# Patient Record
Sex: MALE | Race: WHITE | Employment: FULL TIME | URBAN - METROPOLITAN AREA
[De-identification: names, ages, dates, MRNs, and addresses within clinical notes are randomized per-mention and may not be internally consistent; named-entity substitution may affect disease eponyms.]

---

## 2021-03-02 ENCOUNTER — HOSPITAL ENCOUNTER (EMERGENCY)
Age: 31
Discharge: HOME | End: 2021-03-02
Attending: EMERGENCY MEDICINE

## 2021-03-02 VITALS
RESPIRATION RATE: 20 BRPM | HEIGHT: 70 IN | WEIGHT: 265 LBS | BODY MASS INDEX: 37.94 KG/M2 | DIASTOLIC BLOOD PRESSURE: 99 MMHG | TEMPERATURE: 97.8 F | OXYGEN SATURATION: 97 % | HEART RATE: 88 BPM | SYSTOLIC BLOOD PRESSURE: 150 MMHG

## 2021-03-02 DIAGNOSIS — I10 UNCONTROLLED HYPERTENSION: Primary | ICD-10-CM

## 2021-03-02 PROCEDURE — 99283 EMERGENCY DEPT VISIT LOW MDM: CPT

## 2021-03-02 PROCEDURE — 6370000000 HC RX 637 (ALT 250 FOR IP): Performed by: EMERGENCY MEDICINE

## 2021-03-02 PROCEDURE — 96372 THER/PROPH/DIAG INJ SC/IM: CPT

## 2021-03-02 PROCEDURE — 6360000002 HC RX W HCPCS: Performed by: EMERGENCY MEDICINE

## 2021-03-02 RX ORDER — LISINOPRIL AND HYDROCHLOROTHIAZIDE 20; 12.5 MG/1; MG/1
1 TABLET ORAL DAILY
COMMUNITY
End: 2021-03-02 | Stop reason: SDUPTHER

## 2021-03-02 RX ORDER — CLONIDINE HYDROCHLORIDE 0.1 MG/1
0.2 TABLET ORAL ONCE
Status: COMPLETED | OUTPATIENT
Start: 2021-03-02 | End: 2021-03-02

## 2021-03-02 RX ORDER — LISINOPRIL AND HYDROCHLOROTHIAZIDE 20; 12.5 MG/1; MG/1
1 TABLET ORAL DAILY
Qty: 30 TABLET | Refills: 1 | Status: SHIPPED | OUTPATIENT
Start: 2021-03-02 | End: 2021-03-17 | Stop reason: SDUPTHER

## 2021-03-02 RX ORDER — KETOROLAC TROMETHAMINE 30 MG/ML
60 INJECTION, SOLUTION INTRAMUSCULAR; INTRAVENOUS ONCE
Status: COMPLETED | OUTPATIENT
Start: 2021-03-02 | End: 2021-03-02

## 2021-03-02 RX ADMIN — CLONIDINE HYDROCHLORIDE 0.2 MG: 0.1 TABLET ORAL at 02:35

## 2021-03-02 RX ADMIN — KETOROLAC TROMETHAMINE 60 MG: 30 INJECTION, SOLUTION INTRAMUSCULAR at 02:34

## 2021-03-02 SDOH — HEALTH STABILITY: MENTAL HEALTH: HOW OFTEN DO YOU HAVE A DRINK CONTAINING ALCOHOL?: NEVER

## 2021-03-02 ASSESSMENT — PAIN SCALES - GENERAL: PAINLEVEL_OUTOF10: 6

## 2021-03-02 NOTE — ED PROVIDER NOTES
CHIEF COMPLAINT  Hypertension (Patient comes into ED with c/o headache, hypertension for about a week. Patient states he ran out of his blood pressure medication. )      HISTORY OF PRESENT ILLNESS  Flaquito Trevizo  is a 33 y.o. male who presents to the ED at via private vehicle complaining of uncontrolled hypertension. Patient reports being out of his blood pressure medication for almost 1 year. Does not currently have a PCP. Recently purchased a wrist blood pressure cuff and states pressures have been elevated as high as 190/110. Review of systems positive for neck pain due to falling backwards off of a ladder while at work. States he has been followed by [employer]. and also sees a chiropractor for adjustments. No report of dizziness, ataxia, blurred vision, chest pain, dyspnea, abdominal pain. There are no other complaints, modifying factors or associated symptoms. Nursing notes reviewed. Past medical history:  has a past medical history of Hypertension. Past surgical history:  has a past surgical history that includes knee surgery and shoulder surgery. Home medications:   Prior to Admission medications    Medication Sig Start Date End Date Taking? Authorizing Provider   lisinopril-hydroCHLOROthiazide (PRINZIDE;ZESTORETIC) 20-12.5 MG per tablet Take 1 tablet by mouth daily 3/2/21  Yes Carlita Amezquita MD       No Known Allergies    Social history:  reports that he has never smoked. His smokeless tobacco use includes snuff. He reports that he does not drink alcohol or use drugs. Family history:    Family History   Problem Relation Age of Onset    High Blood Pressure Mother        REVIEW OF SYSTEMS  6 systems reviewed, pertinent positives per HPI otherwise noted to be negative    PHYSICAL EXAM  Vitals:    03/02/21 0304   BP: (!) 150/100   Pulse: 88   Resp: 19   Temp:    SpO2: 97%       GENERAL: Patient is well-developed, well-nourished,  no acute distress.  No apparent discomfort. Non toxic appearing. HEENT:  Normocephalic, atraumatic. PERRL. Conjunctiva appear normal.  External ears are normal.  MMM  NECK: Supple with normal ROM. Trachea midline  LUNGS:  Normal work of breathing. Speaking comfortably in full sentences. EXTREMITIES: 2+ distal pulses w/o edema. MUSCULOSKELETAL:  Atraumatic extremities with normal ROM grossly. No obvious bony deformities. SKIN: Warm/dry. No rashes/lesions noted. PSYCHIATRIC: Patient is alert and oriented with normal affect  NEUROLOGIC: Cranial nerves grossly intact. Moves all extremities with equal strength. No gross sensory deficits. Answers questions/follows commands appropriately. ED COURSE/MDM  Nursing notes reviewed. Blood pressure improving with clonidine. Patient resting comfortably on reevaluation, appears well. Discussed the importance of close outpatient follow-up for further blood pressure management as well as general health maintenance. Earlier report of neck pain and headache in triage was actually related to a workplace injury for which he is following up with [employer]'s [employer]. and a chiropractor. Clinical Impression  Based on the presenting complaint, history, and physical exam, multiple diagnoses were considered. Exam and workup here most c/w:  1. Uncontrolled hypertension        I discussed with Morales Nichole the results of evaluation in the ED, diagnosis, care, and prognosis. The plan is to discharge to home. Patient is in agreement with plan and questions have been answered. I also discussed with Morales Nichole the reasons which may require a return visit and the importance of follow-up care. The patient is well-appearing, nontoxic, and improved at the time of discharge. Patient agrees to call to arrange follow-up care as directed. Morales Nichole understands to return immediately for worsening/change in symptoms.       Patient will be started on the following medications from the ED:  New Prescriptions    No medications on file         Disposition  Pt is discharged in stable condition.     Disposition Vitals:  BP (!) 150/100   Pulse 88   Temp 97.8 °F (36.6 °C)   Resp 19   Ht 5' 10\" (1.778 m)   Wt 265 lb (120.2 kg)   SpO2 97%   BMI 38.02 kg/m²                  Lindy Tidwell MD  03/02/21 8697

## 2021-03-17 ENCOUNTER — OFFICE VISIT (OUTPATIENT)
Dept: FAMILY MEDICINE CLINIC | Age: 31
End: 2021-03-17

## 2021-03-17 VITALS
SYSTOLIC BLOOD PRESSURE: 150 MMHG | BODY MASS INDEX: 42.66 KG/M2 | OXYGEN SATURATION: 96 % | DIASTOLIC BLOOD PRESSURE: 100 MMHG | HEIGHT: 70 IN | WEIGHT: 298 LBS | TEMPERATURE: 98 F | HEART RATE: 103 BPM

## 2021-03-17 DIAGNOSIS — E66.01 CLASS 3 SEVERE OBESITY DUE TO EXCESS CALORIES WITH SERIOUS COMORBIDITY AND BODY MASS INDEX (BMI) OF 40.0 TO 44.9 IN ADULT (HCC): ICD-10-CM

## 2021-03-17 DIAGNOSIS — I10 HYPERTENSION, UNSPECIFIED TYPE: ICD-10-CM

## 2021-03-17 DIAGNOSIS — Z00.00 ANNUAL PHYSICAL EXAM: Primary | ICD-10-CM

## 2021-03-17 LAB
A/G RATIO: 1.4 (ref 1.1–2.2)
ALBUMIN SERPL-MCNC: 5 G/DL (ref 3.4–5)
ALP BLD-CCNC: 79 U/L (ref 40–129)
ALT SERPL-CCNC: 75 U/L (ref 10–40)
ANION GAP SERPL CALCULATED.3IONS-SCNC: 16 MMOL/L (ref 3–16)
AST SERPL-CCNC: 30 U/L (ref 15–37)
BILIRUB SERPL-MCNC: 0.5 MG/DL (ref 0–1)
BUN BLDV-MCNC: 15 MG/DL (ref 7–20)
CALCIUM SERPL-MCNC: 10 MG/DL (ref 8.3–10.6)
CHLORIDE BLD-SCNC: 99 MMOL/L (ref 99–110)
CHOLESTEROL, TOTAL: 192 MG/DL (ref 0–199)
CO2: 24 MMOL/L (ref 21–32)
CREAT SERPL-MCNC: 1 MG/DL (ref 0.9–1.3)
GFR AFRICAN AMERICAN: >60
GFR NON-AFRICAN AMERICAN: >60
GLOBULIN: 3.5 G/DL
GLUCOSE BLD-MCNC: 106 MG/DL (ref 70–99)
HCT VFR BLD CALC: 50.5 % (ref 40.5–52.5)
HDLC SERPL-MCNC: 28 MG/DL (ref 40–60)
HEMOGLOBIN: 17.3 G/DL (ref 13.5–17.5)
LDL CHOLESTEROL CALCULATED: ABNORMAL MG/DL
LDL CHOLESTEROL DIRECT: 96 MG/DL
MCH RBC QN AUTO: 28.7 PG (ref 26–34)
MCHC RBC AUTO-ENTMCNC: 34.3 G/DL (ref 31–36)
MCV RBC AUTO: 83.6 FL (ref 80–100)
PDW BLD-RTO: 13.6 % (ref 12.4–15.4)
PLATELET # BLD: 289 K/UL (ref 135–450)
PMV BLD AUTO: 9 FL (ref 5–10.5)
POTASSIUM SERPL-SCNC: 4.9 MMOL/L (ref 3.5–5.1)
RBC # BLD: 6.04 M/UL (ref 4.2–5.9)
SODIUM BLD-SCNC: 139 MMOL/L (ref 136–145)
TOTAL PROTEIN: 8.5 G/DL (ref 6.4–8.2)
TRIGL SERPL-MCNC: 493 MG/DL (ref 0–150)
TSH SERPL DL<=0.05 MIU/L-ACNC: 1.91 UIU/ML (ref 0.27–4.2)
VLDLC SERPL CALC-MCNC: ABNORMAL MG/DL
WBC # BLD: 8.9 K/UL (ref 4–11)

## 2021-03-17 PROCEDURE — 99203 OFFICE O/P NEW LOW 30 MIN: CPT | Performed by: NURSE PRACTITIONER

## 2021-03-17 PROCEDURE — 36415 COLL VENOUS BLD VENIPUNCTURE: CPT | Performed by: NURSE PRACTITIONER

## 2021-03-17 RX ORDER — LISINOPRIL AND HYDROCHLOROTHIAZIDE 20; 12.5 MG/1; MG/1
2 TABLET ORAL DAILY
Qty: 60 TABLET | Refills: 1 | Status: SHIPPED | OUTPATIENT
Start: 2021-03-17 | End: 2021-05-26 | Stop reason: SDUPTHER

## 2021-03-17 SDOH — ECONOMIC STABILITY: FOOD INSECURITY: WITHIN THE PAST 12 MONTHS, THE FOOD YOU BOUGHT JUST DIDN'T LAST AND YOU DIDN'T HAVE MONEY TO GET MORE.: NEVER TRUE

## 2021-03-17 SDOH — ECONOMIC STABILITY: TRANSPORTATION INSECURITY
IN THE PAST 12 MONTHS, HAS THE LACK OF TRANSPORTATION KEPT YOU FROM MEDICAL APPOINTMENTS OR FROM GETTING MEDICATIONS?: NO

## 2021-03-17 SDOH — ECONOMIC STABILITY: TRANSPORTATION INSECURITY
IN THE PAST 12 MONTHS, HAS LACK OF TRANSPORTATION KEPT YOU FROM MEETINGS, WORK, OR FROM GETTING THINGS NEEDED FOR DAILY LIVING?: NO

## 2021-03-17 ASSESSMENT — ENCOUNTER SYMPTOMS
VOMITING: 0
DIARRHEA: 0
ABDOMINAL PAIN: 0
SHORTNESS OF BREATH: 0
NAUSEA: 0
COUGH: 0

## 2021-03-17 ASSESSMENT — PATIENT HEALTH QUESTIONNAIRE - PHQ9
SUM OF ALL RESPONSES TO PHQ QUESTIONS 1-9: 0
SUM OF ALL RESPONSES TO PHQ QUESTIONS 1-9: 0

## 2021-03-17 NOTE — PATIENT INSTRUCTIONS
- Get labs drawn today. I will call with results in a few days. - Increase your Prinzide to 2 tablets once per day. - work on diet modifications and increasing exercise to help with blood pressure and weight loss. - DASH diet to help with blood pressure. - Continue taking blood pressure at home with arm cuff. Bring readings to next visit. Follow up in 2 weeks. Patient Education        Low Sodium Diet (2,000 Milligram): Care Instructions  Overview     Limiting sodium can be an important part of managing some health problems. The most common source of sodium is salt. People get most of the salt in their diet from canned, prepared, and packaged foods. Fast food and restaurant meals also are very high in sodium. Your doctor will probably limit your sodium to less than 2,000 milligrams (mg) a day. This limit counts all the sodium in prepared and packaged foods and any salt you add to your food. Follow-up care is a key part of your treatment and safety. Be sure to make and go to all appointments, and call your doctor if you are having problems. It's also a good idea to know your test results and keep a list of the medicines you take. How can you care for yourself at home? Read food labels  · Read labels on cans and food packages. The labels tell you how much sodium is in each serving. Make sure that you look at the serving size. If you eat more than the serving size, you have eaten more sodium. · Food labels also tell you the Percent Daily Value for sodium. Choose products with low Percent Daily Values for sodium. · Be aware that sodium can come in forms other than salt, including monosodium glutamate (MSG), sodium citrate, and sodium bicarbonate (baking soda). MSG is often added to Asian food. When you eat out, you can sometimes ask for food without MSG or added salt.   Buy low-sodium foods  · Buy foods that are labeled \"unsalted\" (no salt added), \"sodium-free\" (less than 5 mg of sodium per serving), or \"low-sodium\" (140 mg or less of sodium per serving). Foods labeled \"reduced-sodium\" and \"light sodium\" may still have too much sodium. Be sure to read the label to see how much sodium you are getting. · Buy fresh vegetables, or frozen vegetables without added sauces. Buy low-sodium versions of canned vegetables, soups, and other canned goods. Prepare low-sodium meals  · Cut back on the amount of salt you use in cooking. This will help you adjust to the taste. Do not add salt after cooking. One teaspoon of salt has about 2,300 mg of sodium. · Take the salt shaker off the table. · Flavor your food with garlic, lemon juice, onion, vinegar, herbs, and spices. Do not use soy sauce, lite soy sauce, steak sauce, onion salt, garlic salt, celery salt, or ketchup on your food. · Use low-sodium salad dressings, sauces, and ketchup. Or make your own salad dressings and sauces without adding salt. · Use less salt (or none) when recipes call for it. You can often use half the salt a recipe calls for without losing flavor. Other foods such as rice, pasta, and grains do not need added salt. · Rinse canned vegetables, and cook them in fresh water. This removes somebut not allof the salt. · Avoid water that is naturally high in sodium or that has been treated with water softeners, which add sodium. If you buy bottled water, read the label and choose a sodium-free brand. Avoid high-sodium foods  · Avoid eating:  ? Smoked, cured, salted, and canned meat, fish, and poultry. ? Ham, malcolm, hot dogs, and luncheon meats. ? Regular, hard, and processed cheese and regular peanut butter. ? Crackers with salted tops, and other salted snack foods such as pretzels, chips, and salted popcorn. ? Frozen prepared meals, unless labeled low-sodium. ? Canned and dried soups, broths, and bouillon, unless labeled sodium-free or low-sodium. ? Canned vegetables, unless labeled sodium-free or low-sodium. ?  [place] fries, pizza, tacos, and other fast foods. ? Pickles, olives, ketchup, and other condiments, especially soy sauce, unless labeled sodium-free or low-sodium. Where can you learn more? Go to https://barbara.[employer]. org and sign in to your [employer] account. Enter V241 in the [facility] box to learn more about \"Low Sodium Diet (2,000 Milligram): Care Instructions. \"     If you do not have an account, please click on the \"Sign Up Now\" link. Current as of: December 17, 2020               Content Version: 12.8  © 2006-2021 [employer]. Care instructions adapted under license by [facility] ([facility]). If you have questions about a medical condition or this instruction, always ask your healthcare professional. [address] any warranty or liability for your use of this information. Patient Education        Low Sodium Diet (2,000 Milligram): Care Instructions  Overview     Limiting sodium can be an important part of managing some health problems. The most common source of sodium is salt. People get most of the salt in their diet from canned, prepared, and packaged foods. Fast food and restaurant meals also are very high in sodium. Your doctor will probably limit your sodium to less than 2,000 milligrams (mg) a day. This limit counts all the sodium in prepared and packaged foods and any salt you add to your food. Follow-up care is a key part of your treatment and safety. Be sure to make and go to all appointments, and call your doctor if you are having problems. It's also a good idea to know your test results and keep a list of the medicines you take. How can you care for yourself at home? Read food labels  · Read labels on cans and food packages. The labels tell you how much sodium is in each serving. Make sure that you look at the serving size. If you eat more than the serving size, you have eaten more sodium. · Food labels also tell you the Percent Daily Value for sodium.  Choose products with low Percent Daily Values for sodium. · Be aware that sodium can come in forms other than salt, including monosodium glutamate (MSG), sodium citrate, and sodium bicarbonate (baking soda). MSG is often added to Asian food. When you eat out, you can sometimes ask for food without MSG or added salt. Buy low-sodium foods  · Buy foods that are labeled \"unsalted\" (no salt added), \"sodium-free\" (less than 5 mg of sodium per serving), or \"low-sodium\" (140 mg or less of sodium per serving). Foods labeled \"reduced-sodium\" and \"light sodium\" may still have too much sodium. Be sure to read the label to see how much sodium you are getting. · Buy fresh vegetables, or frozen vegetables without added sauces. Buy low-sodium versions of canned vegetables, soups, and other canned goods. Prepare low-sodium meals  · Cut back on the amount of salt you use in cooking. This will help you adjust to the taste. Do not add salt after cooking. One teaspoon of salt has about 2,300 mg of sodium. · Take the salt shaker off the table. · Flavor your food with garlic, lemon juice, onion, vinegar, herbs, and spices. Do not use soy sauce, lite soy sauce, steak sauce, onion salt, garlic salt, celery salt, or ketchup on your food. · Use low-sodium salad dressings, sauces, and ketchup. Or make your own salad dressings and sauces without adding salt. · Use less salt (or none) when recipes call for it. You can often use half the salt a recipe calls for without losing flavor. Other foods such as rice, pasta, and grains do not need added salt. · Rinse canned vegetables, and cook them in fresh water. This removes somebut not allof the salt. · Avoid water that is naturally high in sodium or that has been treated with water softeners, which add sodium. If you buy bottled water, read the label and choose a sodium-free brand. Avoid high-sodium foods  · Avoid eating:  ? Smoked, cured, salted, and canned meat, fish, and poultry.   ? Ham, malcolm, hot dogs, and luncheon meats. ? Regular, hard, and processed cheese and regular peanut butter. ? Crackers with salted tops, and other salted snack foods such as pretzels, chips, and salted popcorn. ? Frozen prepared meals, unless labeled low-sodium. ? Canned and dried soups, broths, and bouillon, unless labeled sodium-free or low-sodium. ? Canned vegetables, unless labeled sodium-free or low-sodium. ? [place] fries, pizza, tacos, and other fast foods. ? Pickles, olives, ketchup, and other condiments, especially soy sauce, unless labeled sodium-free or low-sodium. Where can you learn more? Go to https://[employer]Mintedeb[employer] org and sign in to your [employer] account. Enter K438 in the [facility] box to learn more about \"Low Sodium Diet (2,000 Milligram): Care Instructions. \"     If you do not have an account, please click on the \"Sign Up Now\" link. Current as of: December 17, 2020               Content Version: 12.8  © 0621-3085 [employer] Inc[employer]. Care instructions adapted under license by [facility] ([facility]). If you have questions about a medical condition or this instruction, always ask your healthcare professional. [address] any warranty or liability for your use of this information.

## 2021-03-17 NOTE — PROGRESS NOTES
3/17/2021     Vibha Fleming (:  1990) is a 33 y.o. male, here for evaluation of the following medical concerns:    Chief Complaint   Patient presents with   Murali Mckeon     HPI   Here for new patient to establish care. Has never been to a primary care physician previously. Has a baby on the way and would like to start getting healthy. He states he has numerous orthopedic injuries and nine concussions from sports, [employer] and bull riding. Starting a new job next Monday as a . Recently moved here from [place]. Was in ED for hypertension - 183/136. Had headache that was not relieved with Tylenol. He was discharged with Prinzide 20-12.5 and is currently taking it once daily. He has been checking his blood pressure in morning with SBPs in the 140s and has noticed it increasing in the evening 170/90. He states he has not had a SBP in the 120s/130s since high school. He has not been officially diagnosed with hypertension, but had high blood pressure when he was in [place] during a hospital visit. He was given a prescription for Prinzide in [place], but he was not consistent with compliance only taking it when he felt bad. The prescription ran out and he never got it refilled due to no PCP. Weighed 180lbs after graduating from high school. He states he started gaining weight during a neck injury from bull riding. He was in a neck brace for 6 months for precautions. He started . His diet consisted of a honey bun and a mountain dew for breakfast, skipped lunch, and then would overeat at dinner time. Review of Systems   Constitutional: Negative for activity change, appetite change, fatigue, fever and unexpected weight change. Respiratory: Negative for cough and shortness of breath. Cardiovascular: Positive for leg swelling (at end of the day). Negative for chest pain.    Gastrointestinal: Negative for abdominal pain, diarrhea, nausea and vomiting. Neurological: Positive for headaches. Negative for dizziness. Prior to Visit Medications    Medication Sig Taking? Authorizing Provider   lisinopril-hydroCHLOROthiazide (PRINZIDE;ZESTORETIC) 20-12.5 MG per tablet Take 2 tablets by mouth daily Yes KELLI Bland - CNP        Social History     Tobacco Use    Smoking status: Never Smoker    Smokeless tobacco: Current User     Types: Snuff   Substance Use Topics    Alcohol use: Never     Frequency: Never        Vitals:    03/17/21 0918 03/17/21 0919   BP: (!) 152/99 (!) 150/100   Site: Left Lower Arm Right Lower Arm   Position: Sitting Sitting   Cuff Size: Medium Adult Medium Adult   Pulse: 103    Temp: 98 °F (36.7 °C)    SpO2: 96%    Weight: 298 lb (135.2 kg)    Height: 5' 10\" (1.778 m)      Estimated body mass index is 42.76 kg/m² as calculated from the following:    Height as of this encounter: 5' 10\" (1.778 m). Weight as of this encounter: 298 lb (135.2 kg). Physical Exam  Constitutional:       General: He is awake. Appearance: He is well-developed. He is morbidly obese. HENT:      Head: Normocephalic. Eyes:      Extraocular Movements: Extraocular movements intact. Conjunctiva/sclera: Conjunctivae normal.      Pupils: Pupils are equal, round, and reactive to light. Cardiovascular:      Rate and Rhythm: Normal rate and regular rhythm. Heart sounds: No murmur. No friction rub. No gallop. Pulmonary:      Effort: Pulmonary effort is normal. No respiratory distress. Breath sounds: Normal breath sounds. No wheezing. Abdominal:      General: Abdomen is protuberant. Musculoskeletal: Normal range of motion. Right lower leg: No edema. Left lower leg: No edema. Skin:     General: Skin is warm and dry. Neurological:      Mental Status: He is alert. Psychiatric:         Mood and Affect: Mood normal.         Behavior: Behavior normal. Behavior is cooperative.      ASSESSMENT/PLAN: 1. Annual physical exam  2. Hypertension, unspecified type  - Increase Prinzide to two tablets, once daily.  - Educated on diet modifications to include decreasing salt intake and limiting fat intake. - Continue taking blood pressure at home and bring list of readings to next visit. - CBC  - COMPREHENSIVE METABOLIC PANEL  - HEMOGLOBIN A1C  - Lipid Panel  - TSH without Reflex  3. Class 3 severe obesity due to excess calories with serious comorbidity and body mass index (BMI) of 40.0 to 44.9 in [facility])  - Educated on diet modifications and increasing physical activity. Return in about 2 weeks (around 3/31/2021). An electronic signature was used to authenticate this note.     --KELLI Morales - CNP on 3/17/2021 at 10:25 AM

## 2021-03-18 LAB
ESTIMATED AVERAGE GLUCOSE: 102.5 MG/DL
HBA1C MFR BLD: 5.2 %

## 2021-04-01 ENCOUNTER — OFFICE VISIT (OUTPATIENT)
Dept: FAMILY MEDICINE CLINIC | Age: 31
End: 2021-04-01

## 2021-04-01 VITALS
HEART RATE: 92 BPM | DIASTOLIC BLOOD PRESSURE: 85 MMHG | WEIGHT: 294 LBS | HEIGHT: 70 IN | SYSTOLIC BLOOD PRESSURE: 141 MMHG | OXYGEN SATURATION: 96 % | TEMPERATURE: 98.9 F | BODY MASS INDEX: 42.09 KG/M2

## 2021-04-01 DIAGNOSIS — E66.01 CLASS 3 SEVERE OBESITY DUE TO EXCESS CALORIES WITH SERIOUS COMORBIDITY AND BODY MASS INDEX (BMI) OF 40.0 TO 44.9 IN ADULT (HCC): ICD-10-CM

## 2021-04-01 DIAGNOSIS — I10 ESSENTIAL HYPERTENSION: Primary | ICD-10-CM

## 2021-04-01 PROBLEM — E66.813 CLASS 3 SEVERE OBESITY DUE TO EXCESS CALORIES WITH SERIOUS COMORBIDITY AND BODY MASS INDEX (BMI) OF 40.0 TO 44.9 IN ADULT: Status: ACTIVE | Noted: 2021-04-01

## 2021-04-01 PROCEDURE — 99211 OFF/OP EST MAY X REQ PHY/QHP: CPT | Performed by: NURSE PRACTITIONER

## 2021-04-01 RX ORDER — AMLODIPINE BESYLATE 5 MG/1
5 TABLET ORAL DAILY
Qty: 30 TABLET | Refills: 5 | Status: SHIPPED | OUTPATIENT
Start: 2021-04-01 | End: 2021-10-19

## 2021-04-01 ASSESSMENT — ENCOUNTER SYMPTOMS
SHORTNESS OF BREATH: 0
VOMITING: 0
ABDOMINAL PAIN: 0
DIARRHEA: 0
NAUSEA: 0
COUGH: 0

## 2021-04-01 NOTE — PROGRESS NOTES
shortness of breath. Snoring   Cardiovascular: Positive for leg swelling (at end of the day). Negative for chest pain. Gastrointestinal: Negative for abdominal pain, diarrhea, nausea and vomiting. Neurological: Positive for headaches. Negative for dizziness. Prior to Visit Medications    Medication Sig Taking? Authorizing Provider   amLODIPine (NORVASC) 5 MG tablet Take 1 tablet by mouth daily Yes KELLI Lopez CNP   lisinopril-hydroCHLOROthiazide (PRINZIDE;ZESTORETIC) 20-12.5 MG per tablet Take 2 tablets by mouth daily Yes KELLI Lopez CNP        Social History     Tobacco Use    Smoking status: Never Smoker    Smokeless tobacco: Current User     Types: Snuff   Substance Use Topics    Alcohol use: Never     Frequency: Never        Vitals:    04/01/21 1427 04/01/21 1428   BP: (!) 144/92 (!) 141/85   Site: Right Lower Arm Left Lower Arm   Position: Sitting Sitting   Cuff Size: Medium Adult Medium Adult   Pulse: 92    Temp: 98.9 °F (37.2 °C)    SpO2: 96%    Weight: 294 lb (133.4 kg)    Height: 5' 10\" (1.778 m)      Estimated body mass index is 42.18 kg/m² as calculated from the following:    Height as of this encounter: 5' 10\" (1.778 m). Weight as of this encounter: 294 lb (133.4 kg). Physical Exam  Constitutional:       General: He is awake. Appearance: He is well-developed. He is morbidly obese. HENT:      Head: Normocephalic. Eyes:      Extraocular Movements: Extraocular movements intact. Conjunctiva/sclera: Conjunctivae normal.      Pupils: Pupils are equal, round, and reactive to light. Cardiovascular:      Rate and Rhythm: Normal rate and regular rhythm. Heart sounds: No murmur. No friction rub. No gallop. Pulmonary:      Effort: Pulmonary effort is normal. No respiratory distress. Breath sounds: Normal breath sounds. No wheezing. Abdominal:      General: Abdomen is protuberant. Musculoskeletal: Normal range of motion. Right lower leg: No edema. Left lower leg: No edema. Skin:     General: Skin is warm and dry. Neurological:      Mental Status: He is alert. Psychiatric:         Mood and Affect: Mood normal.         Behavior: Behavior normal. Behavior is cooperative. ASSESSMENT/PLAN:  1. Essential hypertension- remains uncontrolled but has improved. Will continue Lisinopril-HCTZ and add on amlodipine 5 mg. Advised on possible side effects including swelling.  - amLODIPine (NORVASC) 5 MG tablet; Take 1 tablet by mouth daily  Dispense: 30 tablet; Refill: 5    2. Class 3 severe obesity due to excess calories with serious comorbidity and body mass index (BMI) of 40.0 to 44.9 in adult [facility])- Educated on diet modifications and increasing physical activity. Return in about 1 month (around 5/1/2021). An electronic signature was used to authenticate this note.     --KELLI Mcgowan - CNP on 4/1/2021 at 8:48 PM

## 2021-04-01 NOTE — PATIENT INSTRUCTIONS
Start amlodipine daily for high blood pressure  Continue Lisinopril-HCTZ  Low salt diet  Exercise  Follow up next month

## 2021-05-26 RX ORDER — LISINOPRIL AND HYDROCHLOROTHIAZIDE 20; 12.5 MG/1; MG/1
2 TABLET ORAL DAILY
Qty: 60 TABLET | Refills: 5 | Status: SHIPPED | OUTPATIENT
Start: 2021-05-26 | End: 2022-01-24

## 2021-06-29 ENCOUNTER — TELEMEDICINE (OUTPATIENT)
Dept: FAMILY MEDICINE CLINIC | Age: 31
End: 2021-06-29
Payer: COMMERCIAL

## 2021-06-29 DIAGNOSIS — J40 BRONCHITIS: Primary | ICD-10-CM

## 2021-06-29 PROCEDURE — 99213 OFFICE O/P EST LOW 20 MIN: CPT | Performed by: NURSE PRACTITIONER

## 2021-06-29 RX ORDER — ALBUTEROL SULFATE 90 UG/1
2 AEROSOL, METERED RESPIRATORY (INHALATION) 4 TIMES DAILY PRN
Qty: 1 INHALER | Refills: 0 | Status: SHIPPED | OUTPATIENT
Start: 2021-06-29 | End: 2021-12-10

## 2021-06-29 RX ORDER — PREDNISONE 10 MG/1
TABLET ORAL
Qty: 18 TABLET | Refills: 0 | Status: SHIPPED | OUTPATIENT
Start: 2021-06-29 | End: 2021-07-06 | Stop reason: ALTCHOICE

## 2021-06-29 ASSESSMENT — ENCOUNTER SYMPTOMS
COUGH: 1
DIARRHEA: 0
NAUSEA: 0
CHEST TIGHTNESS: 1
VOMITING: 0
SHORTNESS OF BREATH: 1

## 2021-06-29 NOTE — PATIENT INSTRUCTIONS
Start prednisone. (oral steroid taper)- you should avoid taking NSAIDs while on this medication (ex: ibuprofen, aleve, aspirin). Tylenol is OK to take. Albuterol inhaler 2 puffs every 4 hours as needed  Improvement in symptoms in 2 days call office or if symptoms worsen go to the emergency room.

## 2021-06-29 NOTE — LETTER
[address]  Phone: [phone]  Fax: [phone]    Corrinne Check, APRN - CNP        June 29, 2021     Patient: Ryan Bautista   YOB: 1990   Date of Visit: 6/29/2021       To Whom it May Concern:    Ryan Bautista was seen in my clinic on 6/29/2021. Please excuse his absence from work for the rest of the day today, 6/29/21, 6/30/21 and 7/1/21. If you have any questions or concerns, please don't hesitate to call.     Sincerely,           Corrinne Check, APRN - CNP

## 2021-06-29 NOTE — PROGRESS NOTES
Patient-Reported Weight 290   Patient-Reported Height 510   Patient-Reported Systolic 712   Patient-Reported Diastolic 90        Physical Exam    [INSTRUCTIONS:  \"[x]\" Indicates a positive item  \"[]\" Indicates a negative item  -- DELETE ALL ITEMS NOT EXAMINED]    Constitutional: [x] Appears well-developed and well-nourished [x] No apparent distress      [] Abnormal -     Mental status: [x] Alert and awake  [x] Oriented to person/place/time [x] Able to follow commands    [] Abnormal -     Eyes:   EOM    [x]  Normal    [] Abnormal -   Sclera  [x]  Normal    [] Abnormal -          Discharge [x]  None visible   [] Abnormal -     HENT: [x] Normocephalic, atraumatic  [] Abnormal -   [] Mouth/Throat: Mucous membranes are moist    External Ears [] Normal  [] Abnormal -    Neck: [x] No visualized mass [] Abnormal -     Pulmonary/Chest: [x] Respiratory effort normal   [x] No visualized signs of difficulty breathing or respiratory distress        [] Abnormal -      Musculoskeletal:   [x] Normal gait with no signs of ataxia         [x] Normal range of motion of neck        [] Abnormal -     Neurological:        [x] No Facial Asymmetry (Cranial nerve 7 motor function) (limited exam due to video visit)          [x] No gaze palsy        [] Abnormal -          Skin:        [x] No significant exanthematous lesions or discoloration noted on facial skin         [] Abnormal -            Psychiatric:       [x] Normal Affect [] Abnormal -        [x] No Hallucinations    Other pertinent observable physical exam findings:-          On this date 6/29/2021 I have spent 20 minutes reviewing previous notes, test results and face to face (virtual) with the patient discussing the diagnosis and importance of compliance with the treatment plan as well as documenting on the day of the visit. Abdi Massey, was evaluated through a synchronous (real-time) audio-video encounter.  The patient (or guardian if applicable) is aware that this is a billable service. Verbal consent to proceed has been obtained within the past 12 months. The visit was conducted pursuant to the emergency declaration under the [address] [place] [address] authority and the [employer] General Act. Patient identification was verified, and a caregiver was present when appropriate. The patient was located in a state where the provider was credentialed to provide care. An electronic signature was used to authenticate this note.     --Trinity Aponte, APRN - CNP

## 2021-07-01 ENCOUNTER — APPOINTMENT (OUTPATIENT)
Dept: CT IMAGING | Age: 31
End: 2021-07-01
Payer: COMMERCIAL

## 2021-07-01 ENCOUNTER — APPOINTMENT (OUTPATIENT)
Dept: GENERAL RADIOLOGY | Age: 31
End: 2021-07-01
Payer: COMMERCIAL

## 2021-07-01 ENCOUNTER — HOSPITAL ENCOUNTER (EMERGENCY)
Age: 31
Discharge: HOME | End: 2021-07-02
Payer: COMMERCIAL

## 2021-07-01 DIAGNOSIS — J18.9 PNEUMONIA DUE TO INFECTIOUS ORGANISM, UNSPECIFIED LATERALITY, UNSPECIFIED PART OF LUNG: Primary | ICD-10-CM

## 2021-07-01 DIAGNOSIS — R06.02 SHORTNESS OF BREATH: ICD-10-CM

## 2021-07-01 DIAGNOSIS — R07.89 CHEST TIGHTNESS: ICD-10-CM

## 2021-07-01 LAB
A/G RATIO: 1.2 (ref 1.1–2.2)
ALBUMIN SERPL-MCNC: 4.5 G/DL (ref 3.4–5)
ALP BLD-CCNC: 82 U/L (ref 40–129)
ALT SERPL-CCNC: 50 U/L (ref 10–40)
ANION GAP SERPL CALCULATED.3IONS-SCNC: 15 MMOL/L (ref 3–16)
AST SERPL-CCNC: <5 U/L (ref 15–37)
BASOPHILS ABSOLUTE: 0 K/UL (ref 0–0.2)
BASOPHILS RELATIVE PERCENT: 0 %
BILIRUB SERPL-MCNC: <0.2 MG/DL (ref 0–1)
BUN BLDV-MCNC: 20 MG/DL (ref 7–20)
CALCIUM SERPL-MCNC: 9.8 MG/DL (ref 8.3–10.6)
CHLORIDE BLD-SCNC: 99 MMOL/L (ref 99–110)
CO2: 22 MMOL/L (ref 21–32)
CREAT SERPL-MCNC: 0.9 MG/DL (ref 0.9–1.3)
EOSINOPHILS ABSOLUTE: 0.4 K/UL (ref 0–0.6)
EOSINOPHILS RELATIVE PERCENT: 2 %
GFR AFRICAN AMERICAN: >60
GFR NON-AFRICAN AMERICAN: >60
GLOBULIN: 3.8 G/DL
GLUCOSE BLD-MCNC: 98 MG/DL (ref 70–99)
HCT VFR BLD CALC: 46.1 % (ref 40.5–52.5)
HEMOGLOBIN: 16.5 G/DL (ref 13.5–17.5)
LYMPHOCYTES ABSOLUTE: 2.9 K/UL (ref 1–5.1)
LYMPHOCYTES RELATIVE PERCENT: 16 %
MCH RBC QN AUTO: 30 PG (ref 26–34)
MCHC RBC AUTO-ENTMCNC: 35.7 G/DL (ref 31–36)
MCV RBC AUTO: 84 FL (ref 80–100)
METAMYELOCYTES RELATIVE PERCENT: 2 %
MONOCYTES ABSOLUTE: 1.1 K/UL (ref 0–1.3)
MONOCYTES RELATIVE PERCENT: 6 %
NEUTROPHILS ABSOLUTE: 13.8 K/UL (ref 1.7–7.7)
NEUTROPHILS RELATIVE PERCENT: 74 %
PDW BLD-RTO: 13.1 % (ref 12.4–15.4)
PLATELET # BLD: 315 K/UL (ref 135–450)
PLATELET SLIDE REVIEW: ADEQUATE
PMV BLD AUTO: 8.2 FL (ref 5–10.5)
POTASSIUM SERPL-SCNC: 4 MMOL/L (ref 3.5–5.1)
RBC # BLD: 5.49 M/UL (ref 4.2–5.9)
SLIDE REVIEW: ABNORMAL
SODIUM BLD-SCNC: 136 MMOL/L (ref 136–145)
TOTAL PROTEIN: 8.3 G/DL (ref 6.4–8.2)
TROPONIN: <0.01 NG/ML
WBC # BLD: 18.1 K/UL (ref 4–11)

## 2021-07-01 PROCEDURE — 71046 X-RAY EXAM CHEST 2 VIEWS: CPT

## 2021-07-01 PROCEDURE — 80053 COMPREHEN METABOLIC PANEL: CPT

## 2021-07-01 PROCEDURE — 93005 ELECTROCARDIOGRAM TRACING: CPT

## 2021-07-01 PROCEDURE — 99284 EMERGENCY DEPT VISIT MOD MDM: CPT

## 2021-07-01 PROCEDURE — 71260 CT THORAX DX C+: CPT

## 2021-07-01 PROCEDURE — 85025 COMPLETE CBC W/AUTO DIFF WBC: CPT

## 2021-07-01 PROCEDURE — 84484 ASSAY OF TROPONIN QUANT: CPT

## 2021-07-01 PROCEDURE — 6360000004 HC RX CONTRAST MEDICATION: Performed by: NURSE PRACTITIONER

## 2021-07-01 RX ORDER — IPRATROPIUM BROMIDE AND ALBUTEROL SULFATE 2.5; .5 MG/3ML; MG/3ML
1 SOLUTION RESPIRATORY (INHALATION) ONCE
Status: DISCONTINUED | OUTPATIENT
Start: 2021-07-01 | End: 2021-07-02

## 2021-07-01 RX ORDER — AMOXICILLIN 875 MG/1
875 TABLET, COATED ORAL 2 TIMES DAILY
COMMUNITY
End: 2021-07-06 | Stop reason: ALTCHOICE

## 2021-07-01 RX ADMIN — IOPAMIDOL 75 ML: 755 INJECTION, SOLUTION INTRAVENOUS at 22:21

## 2021-07-01 ASSESSMENT — PAIN SCALES - GENERAL: PAINLEVEL_OUTOF10: 8

## 2021-07-01 ASSESSMENT — PAIN DESCRIPTION - DESCRIPTORS: DESCRIPTORS: JABBING

## 2021-07-01 ASSESSMENT — PAIN DESCRIPTION - LOCATION: LOCATION: CHEST

## 2021-07-01 ASSESSMENT — PAIN DESCRIPTION - PAIN TYPE: TYPE: ACUTE PAIN

## 2021-07-01 ASSESSMENT — PAIN DESCRIPTION - ORIENTATION: ORIENTATION: MID

## 2021-07-01 NOTE — LETTER
Wagnerbessie Grover was seen and treated in our emergency department on 7/1/2021. He may return to work on 07/07/2021    If you have any questions or concerns, please don't hesitate to call.

## 2021-07-02 VITALS
SYSTOLIC BLOOD PRESSURE: 146 MMHG | RESPIRATION RATE: 22 BRPM | OXYGEN SATURATION: 98 % | WEIGHT: 295 LBS | BODY MASS INDEX: 42.23 KG/M2 | HEIGHT: 70 IN | TEMPERATURE: 99 F | HEART RATE: 82 BPM | DIASTOLIC BLOOD PRESSURE: 88 MMHG

## 2021-07-02 LAB
EKG ATRIAL RATE: 99 BPM
EKG DIAGNOSIS: NORMAL
EKG P AXIS: 53 DEGREES
EKG P-R INTERVAL: 146 MS
EKG Q-T INTERVAL: 334 MS
EKG QRS DURATION: 112 MS
EKG QTC CALCULATION (BAZETT): 428 MS
EKG R AXIS: 2 DEGREES
EKG T AXIS: 32 DEGREES
EKG VENTRICULAR RATE: 99 BPM

## 2021-07-02 RX ORDER — LEVOFLOXACIN 750 MG/1
750 TABLET ORAL DAILY
Qty: 4 TABLET | Refills: 0 | Status: SHIPPED | OUTPATIENT
Start: 2021-07-02 | End: 2021-07-06

## 2021-07-02 RX ORDER — LEVOFLOXACIN 750 MG/1
750 TABLET ORAL DAILY
Qty: 4 TABLET | Refills: 0 | Status: SHIPPED | OUTPATIENT
Start: 2021-07-02 | End: 2021-07-02 | Stop reason: SDUPTHER

## 2021-07-02 RX ORDER — IPRATROPIUM BROMIDE AND ALBUTEROL SULFATE 2.5; .5 MG/3ML; MG/3ML
1 SOLUTION RESPIRATORY (INHALATION) EVERY 4 HOURS
Qty: 360 ML | Refills: 0 | Status: SHIPPED | OUTPATIENT
Start: 2021-07-02 | End: 2021-12-10

## 2021-07-02 RX ORDER — LEVOFLOXACIN 750 MG/1
750 TABLET ORAL ONCE
Status: DISCONTINUED | OUTPATIENT
Start: 2021-07-02 | End: 2021-07-02 | Stop reason: HOSPADM

## 2021-07-02 NOTE — ED NOTES
Patient verbalized understanding in taking medications and follow up. Work note given for patient to be off until Wednesday.       Abraham Williamson RN  07/02/21 0040

## 2021-07-02 NOTE — ED PROVIDER NOTES
EKG Interpretation    Interpreted by emergency department physician    Rhythm: normal sinus   Rate: normal  Axis: normal  Ectopy: none  Conduction: normal  ST Segments: normal  T Waves: normal  Q Waves: none    Clinical Impression: no acute changes    MD Paresh Mcghee MD  07/01/21 1223

## 2021-07-02 NOTE — ED PROVIDER NOTES
tablets by mouth daily 60 tablet 5    amLODIPine (NORVASC) 5 MG tablet Take 1 tablet by mouth daily 30 tablet 5       ALLERGIES    No Known Allergies    FAMILY HISTORY    Family History   Problem Relation Age of Onset    High Blood Pressure Mother     Heart Surgery Mother         CABG    Diabetes Mother     Heart Attack Mother     Diabetes Sister     Diabetes Brother     Heart Surgery Maternal Grandfather     Diabetes Maternal Grandfather     Heart Attack Maternal Grandfather     Hypertension Brother     Diabetes Brother        SOCIAL HISTORY    Social History     Socioeconomic History    Marital status: Single     Spouse name: None    Number of children: None    Years of education: None    Highest education level: None   Occupational History    None   Tobacco Use    Smoking status: Never Smoker    Smokeless tobacco: Current User     Types: Chew   Vaping Use    Vaping Use: Never used   Substance and Sexual Activity    Alcohol use: Never    Drug use: Never    Sexual activity: Yes     Partners: Female   Other Topics Concern    None   Social History Narrative    None     Social Determinants of Health     Financial Resource Strain: Low Risk     Difficulty of Paying Living Expenses: Not hard at all   Food Insecurity: No Food Insecurity    Worried About Running Out of Food in the Last Year: Never true    [place] of Food in the Last Year: Never true   Transportation Needs: No Transportation Needs    Lack of Transportation (Medical): No    Lack of Transportation (Non-Medical):  No   Physical Activity:     Days of Exercise per Week:     Minutes of Exercise per Session:    Stress:     Feeling of Stress :    Social Connections:     Frequency of Communication with Friends and Family:     Frequency of Social Gatherings with Friends and Family:     Attends [employer] Services:     Active Member of Clubs or Organizations:     Attends Club or Organization Meetings:     Marital Status: Intimate Partner Violence:     Fear of Current or Ex-Partner:     Emotionally Abused:     Physically Abused:     Sexually Abused:        REVIEW OF SYSTEMS    10 systems reviewed, pertinent positives per HPI otherwise noted to be negative    PHYSICAL EXAM  Vitals:    07/02/21 0030   BP: (!) 146/88   Pulse: 82   Resp: 22   Temp: 99 °F (37.2 °C)   SpO2: 98%     GENERAL: Patient is well-developed, morbidly obese. Awake and alert. Cooperative. Resting in bed. No apparent distress. HEENT:  Normocephalic, atraumatic. Conjunctiva appear normal. Sclera is non-icteric. External ears are normal.    NECK: Supple with normal ROM. Trachea midline. LUNGS: Equal and symmetric chest rise. Breathing is unlabored. Speaking comfortably in full sentences. Lungs are clear bilaterally to auscultation. Without wheezing, rales, or rhonchi. CADIOVASCULAR:  Regular rate and rhythm. Normal S1-S2 sounds. No murmurs, rubs, or gallops. Capillary refill is brisk in all 4 extremities. Bilateral lower extremities are equal in size, there is no swelling observed. There is no tenderness to palpation. There is no erythema observed or warmth palpated. GI: Soft, nontender, nondistended with positive bowel sounds. No rebound tenderness, guarding or any peritoneal signs. No masses or hepatosplenomegaly    MUSCULOSKELETAL:  No gross deformities or trauma noted. Moving all extremities equally and appropriately. Normal ROM. SKIN: Warm/dry. Skin is intact. No rashes/lesions noted. PSYCHIATRIC: Mood and affect appropriate. Speech is clear and articulate. NEUROLOGIC: Alert and oriented. No focal motor or sensory deficits. LABS  I have reviewed all labs for this visit.    Results for orders placed or performed during the hospital encounter of 07/01/21   Comprehensive Metabolic Panel   Result Value Ref Range    Sodium 136 136 - 145 mmol/L    Potassium 4.0 3.5 - 5.1 mmol/L    Chloride 99 99 - 110 mmol/L    CO2 22 21 - 32 mmol/L    Anion Gap 15 3 - 16    Glucose 98 70 - 99 mg/dL    BUN 20 7 - 20 mg/dL    CREATININE 0.9 0.9 - 1.3 mg/dL    GFR Non-African American >60 >60    GFR African American >60 >60    Calcium 9.8 8.3 - 10.6 mg/dL    Total Protein 8.3 (H) 6.4 - 8.2 g/dL    Albumin 4.5 3.4 - 5.0 g/dL    Albumin/Globulin Ratio 1.2 1.1 - 2.2    Total Bilirubin <0.2 0.0 - 1.0 mg/dL    Alkaline Phosphatase 82 40 - 129 U/L    ALT 50 (H) 10 - 40 U/L    AST <5 (A) 15 - 37 U/L    Globulin 3.8 g/dL   Troponin   Result Value Ref Range    Troponin <0.01 <0.01 ng/mL   CBC Auto Differential   Result Value Ref Range    WBC 18.1 (H) 4.0 - 11.0 K/uL    RBC 5.49 4.20 - 5.90 M/uL    Hemoglobin 16.5 13.5 - 17.5 g/dL    Hematocrit 46.1 40.5 - 52.5 %    MCV 84.0 80.0 - 100.0 fL    MCH 30.0 26.0 - 34.0 pg    MCHC 35.7 31.0 - 36.0 g/dL    RDW 13.1 12.4 - 15.4 %    Platelets 742 391 - 540 K/uL    MPV 8.2 5.0 - 10.5 fL    PLATELET SLIDE REVIEW Adequate     SLIDE REVIEW see below     Neutrophils % 74.0 %    Lymphocytes % 16.0 %    Monocytes % 6.0 %    Eosinophils % 2.0 %    Basophils % 0.0 %    Neutrophils Absolute 13.8 (H) 1.7 - 7.7 K/uL    Lymphocytes Absolute 2.9 1.0 - 5.1 K/uL    Monocytes Absolute 1.1 0.0 - 1.3 K/uL    Eosinophils Absolute 0.4 0.0 - 0.6 K/uL    Basophils Absolute 0.0 0.0 - 0.2 K/uL    Metamyelocytes Relative 2 (A) %       RADIOLOGY    XR CHEST (2 VW)    Result Date: 7/1/2021  EXAMINATION: TWO XRAY VIEWS OF THE CHEST 7/1/2021 7:47 pm COMPARISON: None. HISTORY: ORDERING SYSTEM PROVIDED HISTORY: CP TECHNOLOGIST PROVIDED HISTORY: Reason for exam:->CP Reason for Exam: cp Acuity: Acute Type of Exam: Initial FINDINGS: Frontal and lateral views of the chest.  Normal lung volume. No focal airspace disease. Normal pulmonary vasculature. No pleural effusion or pneumothorax. Normal cardiomediastinal silhouette and great vessels. No acute osseous abnormality. No acute cardiopulmonary process.      CT CHEST PULMONARY EMBOLISM W CONTRAST    Result Date: 7/1/2021  EXAMINATION: CTA OF THE CHEST 7/1/2021 10:11 pm TECHNIQUE: CTA of the chest was performed after the administration of intravenous contrast.  Multiplanar reformatted images are provided for review. MIP images are provided for review. Dose modulation, iterative reconstruction, and/or weight based adjustment of the mA/kV was utilized to reduce the radiation dose to as low as reasonably achievable. COMPARISON: None. HISTORY: ORDERING SYSTEM PROVIDED HISTORY: CP, SOB TECHNOLOGIST PROVIDED HISTORY: Reason for exam:->CP, SOB Decision Support Exception - unselect if not a suspected or confirmed emergency medical condition->Emergency Medical Condition (MA) Reason for Exam: Chest pains and SOB, symptoms started a few weeks ago, no prior history of clot Acuity: Acute Type of Exam: Initial FINDINGS: Pulmonary Arteries: Pulmonary arteries are within normal limits in size. . Artifact degraded evaluation of the pulmonary arteries. No filling defect is identified in the pulmonary arteries to the level of theproximal segmental arteries. Mediastinum: Heart is borderline enlarged. No pericardial effusion. Borderline dilated aortic root with suboptimal evaluation due to motion artifact. Aorta is otherwise within normal limits in size. No luminal defect is appreciated in the visualized thoracic aorta given motion artifact. Lungs/pleura: Central airways are patent. Mosaic attenuation of the lungs. No focal consolidation. No pleural effusion. No pneumothorax. Soft Tissues/Bones: No adenopathy. Minimal degenerative changes noted in the visualized spine without spondylolisthesis. Upper Abdomen: Diffuse hepatic steatosis with fatty sparing along the gallbladder fossa. 1.  Artifact degraded evaluation of the pulmonary arteries. No acute pulmonary embolism to the proximal segmental arteries. 2. Borderline dilated aortic root with suboptimal evaluation due to motion artifact.   No thoracic aortic dissection given motion artifact. Follow-up recommended. 3. Nonspecific mosaic attenuation of the lungs. Correlate with presentation for distal airway inflammation. Correlate clinically to exclude early pneumonia. 4. Other findings as described. HEART SCORE:    History: +0 for low suspicion  EKG: +0 for normal EKG   Age: [de-identified] for age <45 years  Risk factors (includes HLD, HTN, DM, tobacco use, obesity, and +FHx): +2 for known CAD or 3+ risk factors  Initial troponin: +0 for negative troponin    Heart score: 2. This falls under the following category: Score of 0-3, which indicates a very low risk for major adverse cardiac event and supports early discharge    Score 0-3 0.9%-1.7% risk of major adverse cardiac event (MACE). In the HEART Score study, these patients were discharged. ED COURSE/MDM  Patient seen and evaluated. Old records reviewed. Diagnostic testing reviewed and results discussed. I have evaluated this patient. My supervising physician was available for consultation. Mercedes Gomez presented to the ED today with above noted complaints. Patient is afebrile and hemodynamically stable. BP is noted to be hypertensive. He is well saturated on room air. Physical exam does not reveal adventitious breath sounds on exam.     Blood work does show leukocytosis is WBC elevated at 18.1. Absolute neutrophils elevated at 13.8, no further differential shift. No anemia. There is no electrolyte abnormality. No evidence of acute kidney injury or transaminitis. Specimen lipemia has occurred which can be skewing these results. Troponin is negative. EKG is without acute findings. Chest x-ray is unremarkable. I did obtain a CT scan of the chest There was motion degradation but no acute PE was seen to the proximal segmental arteries. Borderline dilated aortic root. No thoracic aortic dissection. Nonspecific mosaic attenuation of the lungs.   There is could be seen with distal airway information and early pneumonia. Pt was given the following medications or treatments in the ED:   Medications   levoFLOXacin (LEVAQUIN) tablet 750 mg (750 mg Oral Not Given 7/2/21 0039)   iopamidol (ISOVUE-370) 76 % injection 75 mL (75 mLs Intravenous Given 7/1/21 2221)     This patient has had these symptoms for a couple of weeks and he is not improving I am going to go ahead and treat him for pneumonia. He was put on amoxicillin approximately 2 to 3 weeks ago, I will go ahead and start the patient on Levaquin for 5 days for pneumonia. I did advise the patient of the CT findings and of the specimen lipemia and advised him to follow-up with his primary care provider regarding this as he may need to have some further testing. At this point I do not feel the patient requires further work up and it is reasonable to discharge the patient. Please refer to AVS for further details regarding discharge instructions. A discussion was had with the patient regarding diagnosis, diagnostic testing results, treatment/ plan of care, and follow up. All questions were answered. Patient will follow up as directed for further evaluation/treatment. The patient was given strict return precautions as we discussed symptoms that would necessitate return to the ED. Patient will return to ED for new/worsening symptoms. The patient verbalized their understanding and agreement with the above plan. I estimate there is LOW risk for ACUTE CORONARY SYNDROME, INTRACRANIAL HEMORRHAGE, MALIGNANT DYSRHYTHMIA or HYPERTENSION, PULMONARY EMBOLISM, SEPSIS, SUBARACHNOID HEMORRHAGE, SUBDURAL HEMATOMA, STROKE, or THORACIC AORTIC DISSECTION, thus I consider the discharge disposition reasonable. Mercedes Gomez and I have discussed the diagnosis and risks, and we agree with discharging home to follow-up with their primary doctor. We also discussed returning to the Emergency Department immediately if new or worsening symptoms occur.  We have discussed the symptoms which are most concerning (e.g., bloody sputum, fever, worsening pain or shortness of breath, vomiting, weakness) that necessitate immediate return. Patient was sent home with a prescription for below medication/s. I did [place] patient on appropriate use of these medication. Discharge Medication List as of 7/2/2021 12:28 AM      START taking these medications    Details   ipratropium-albuterol (DUONEB) 0.5-2.5 (3) MG/3ML SOLN nebulizer solution Inhale 3 mLs into the lungs every 4 hours, Disp-360 mL, R-0Normal             CLINICAL IMPRESSION    1. Pneumonia due to infectious organism, unspecified laterality, unspecified part of lung    2. Chest tightness    3. Shortness of breath           Discharge Vitals:  Blood pressure (!) 146/88, pulse 82, temperature 99 °F (37.2 °C), temperature source Oral, resp. rate 22, height 5' 10\" (1.778 m), weight 295 lb (133.8 kg), SpO2 98 %. FOLLOW UP  KELLI Christopher - CNP  [address] [place] [address]    Call in 1 day  For further evaluation    [facility]  ED  [address] [facility] [address]  Go to   If symptoms worsen      DISPOSITION  Patient was discharged to home in good condition. Comment: Please note this report has been produced using speech recognition software and may contain errors related to that system including errors in grammar, punctuation, and spelling, as well as words and phrases that may be inappropriate. If there are any questions or concerns please feel free to contact the dictating provider for clarification.         KELLI Hernandez - CNP  07/02/21 3656

## 2021-07-06 ENCOUNTER — OFFICE VISIT (OUTPATIENT)
Dept: FAMILY MEDICINE CLINIC | Age: 31
End: 2021-07-06
Payer: COMMERCIAL

## 2021-07-06 VITALS
OXYGEN SATURATION: 96 % | HEART RATE: 104 BPM | WEIGHT: 296 LBS | DIASTOLIC BLOOD PRESSURE: 84 MMHG | SYSTOLIC BLOOD PRESSURE: 130 MMHG | RESPIRATION RATE: 16 BRPM | TEMPERATURE: 98 F | BODY MASS INDEX: 42.47 KG/M2

## 2021-07-06 DIAGNOSIS — I77.810 AORTIC ROOT DILATATION (HCC): ICD-10-CM

## 2021-07-06 DIAGNOSIS — J18.9 PNEUMONIA OF BOTH LUNGS DUE TO INFECTIOUS ORGANISM, UNSPECIFIED PART OF LUNG: ICD-10-CM

## 2021-07-06 DIAGNOSIS — E66.01 CLASS 3 SEVERE OBESITY DUE TO EXCESS CALORIES WITH SERIOUS COMORBIDITY AND BODY MASS INDEX (BMI) OF 40.0 TO 44.9 IN ADULT (HCC): ICD-10-CM

## 2021-07-06 DIAGNOSIS — I51.7 ENLARGED HEART: ICD-10-CM

## 2021-07-06 DIAGNOSIS — K76.0 HEPATIC STEATOSIS: ICD-10-CM

## 2021-07-06 DIAGNOSIS — I10 ESSENTIAL HYPERTENSION: Primary | ICD-10-CM

## 2021-07-06 DIAGNOSIS — G47.33 OSA (OBSTRUCTIVE SLEEP APNEA): ICD-10-CM

## 2021-07-06 PROCEDURE — 99214 OFFICE O/P EST MOD 30 MIN: CPT | Performed by: NURSE PRACTITIONER

## 2021-07-06 ASSESSMENT — ENCOUNTER SYMPTOMS
NAUSEA: 0
CHEST TIGHTNESS: 1
COUGH: 1
DIARRHEA: 0
VOMITING: 0
SHORTNESS OF BREATH: 1

## 2021-07-06 ASSESSMENT — PATIENT HEALTH QUESTIONNAIRE - PHQ9
2. FEELING DOWN, DEPRESSED OR HOPELESS: 0
SUM OF ALL RESPONSES TO PHQ9 QUESTIONS 1 & 2: 0
SUM OF ALL RESPONSES TO PHQ QUESTIONS 1-9: 0
1. LITTLE INTEREST OR PLEASURE IN DOING THINGS: 0
SUM OF ALL RESPONSES TO PHQ QUESTIONS 1-9: 0
SUM OF ALL RESPONSES TO PHQ QUESTIONS 1-9: 0

## 2021-07-06 NOTE — PROGRESS NOTES
History     Tobacco Use    Smoking status: Never Smoker    Smokeless tobacco: Former User     Types: Chew   Substance Use Topics    Alcohol use: Never        Vitals:    07/06/21 1050   BP: 130/84   Site: Left Upper Arm   Position: Sitting   Pulse: 104   Resp: 16   Temp: 98 °F (36.7 °C)   TempSrc: Temporal   SpO2: 96%   Weight: 296 lb (134.3 kg)     Estimated body mass index is 42.47 kg/m² as calculated from the following:    Height as of 7/1/21: 5' 10\" (1.778 m). Weight as of this encounter: 296 lb (134.3 kg). Physical Exam  Vitals and nursing note reviewed. Constitutional:       General: He is not in acute distress. Appearance: Normal appearance. He is well-developed. He is obese. He is not ill-appearing, toxic-appearing or diaphoretic. HENT:      Head: Normocephalic and atraumatic. Right Ear: External ear normal.      Left Ear: External ear normal.   Eyes:      Conjunctiva/sclera: Conjunctivae normal.      Pupils: Pupils are equal, round, and reactive to light. Cardiovascular:      Rate and Rhythm: Normal rate and regular rhythm. Heart sounds: Normal heart sounds. No murmur heard. No friction rub. No gallop. Pulmonary:      Effort: Pulmonary effort is normal. No respiratory distress. Breath sounds: Normal breath sounds. No stridor. No wheezing, rhonchi or rales. Neurological:      General: No focal deficit present. Mental Status: He is alert and oriented to person, place, and time. Mental status is at baseline. Cranial Nerves: No cranial nerve deficit. ASSESSMENT/PLAN:  1. Essential hypertension- controlled continue Hyzaar and Novasc    2. Class 3 severe obesity due to excess calories with serious comorbidity and body mass index (BMI) of 40.0 to 44.9 in adult [facility])- referral to bariatric surgery  - [address], [place]    3.  DAYNA (obstructive sleep apnea)- referral to [facility]

## 2021-07-06 NOTE — PATIENT INSTRUCTIONS
· Repeat chest x-ray in 4 weeks  · Blood work looks OK  · BP controlled  · Referral to weight loss clinic and sleep clinic  · Schedule echo

## 2021-07-07 ENCOUNTER — TELEPHONE (OUTPATIENT)
Dept: PULMONOLOGY | Age: 31
End: 2021-07-07

## 2021-07-07 NOTE — TELEPHONE ENCOUNTER
Within this Telehealth Consent, the terms you and yours refer to the person using the Telehealth Service (Service), or in the case of a use of the Service by or on behalf of a minor, you and yours refer to and include (i) the parent or legal guardian who provides consent to the use of the Service by such minor or uses the Service on behalf of such minor, and (ii) the minor for whom consent is being provided or on whose behalf the Service is being utilized. When using Service, you will be consulting with your health care providers via the use of Telehealth.   Telehealth involves the delivery of healthcare services using electronic communications, information technology or other means between a healthcare provider and a patient who are not in the same physical location. Telehealth may be used for diagnosis, treatment, follow-up and/or patient education, and may include, but is not limited to, one or more of the following:    Electronic transmission of medical records, photo images, personal health information or other data between a patient and a healthcare provider    Interactions between a patient and healthcare provider via audio, video and/or data communications    Use of output data from medical devices, sound and video files    Anticipated Benefits   The use of Telehealth by your Provider(s) through the Service may have the following possible benefits:    Making it easier and more efficient for you to access medical care and treatment for the conditions treated by such Provider(s) utilizing the Service    Allowing you to obtain medical care and treatment by Provider(s) at times that are convenient for you    Enabling you to interact with Provider(s) without the necessity of an in-office appointment     Possible Risks   While the use of Telehealth can provide potential benefits for you, there are also potential risks associated with the use of Telehealth.  These risks include, but may not be the provision of medical care and treatment via Telehealth and the Service and you may not be able to receive diagnosis and/or treatment through the Service for every condition for which you seek diagnosis and/or treatment. 11. There are potential risks to the use of Telehealth, including but not limited to the risks described in this Telehealth Consent. 12. Your Provider(s) have discussed the use of Telehealth and the Service with you, including the benefits and risks of such and you have provided oral consent to your Provider(s) for the use of Telehealth and the Service. 15. You understand that it is your duty to provide your Provider(s) truthful, accurate and complete information, including all relevant information regarding care that you may have received or may be receiving from other healthcare providers outside of the Service. 14. You understand that each of your Provider(s) may determine in his or sole discretion that your condition is not suitable for diagnosis and/or treatment using the Service, and that you may need to seek medical care and treatment a specialist or other healthcare provider, outside of the Service. 15. You understand that you are fully responsible for payment for all services provided by Provider(s) or through use of the Service and that you may not be able to use third-party insurance. 16. You represent that (a) you have read this Telehealth Consent carefully, (b) you understand the risks and benefits of the Service and the use of Telehealth in the medical care and treatment provided to you by Provider(s) using the Service, and (c) you have the legal capacity and authority to provide this consent for yourself and/or the minor for which you are consenting under applicable federal and state laws, including laws relating to the age of [de-identified] and/or parental/guardian consent.    17. You give your informed consent to the use of Telehealth by Provider(s) using the Service under the terms described in the Terms of Service and this Telehealth Consent. The patient was read the following statement and has consented to the visit as of 7/7/21. The patient has been scheduled for their first telehealth visit on 9/14/21 with .

## 2021-07-15 RX ORDER — METHYLPREDNISOLONE 4 MG/1
TABLET ORAL
Qty: 1 KIT | Refills: 0 | Status: SHIPPED | OUTPATIENT
Start: 2021-07-15 | End: 2021-12-03

## 2021-09-14 ENCOUNTER — VIRTUAL VISIT (OUTPATIENT)
Dept: PULMONOLOGY | Age: 31
End: 2021-09-14

## 2021-09-14 ENCOUNTER — TELEPHONE (OUTPATIENT)
Dept: PULMONOLOGY | Age: 31
End: 2021-09-14

## 2021-09-14 DIAGNOSIS — Z72.820 SLEEP DEPRIVATION: ICD-10-CM

## 2021-09-14 DIAGNOSIS — R53.83 FATIGUE, UNSPECIFIED TYPE: ICD-10-CM

## 2021-09-14 DIAGNOSIS — G47.30 OBSERVED SLEEP APNEA: ICD-10-CM

## 2021-09-14 DIAGNOSIS — R06.83 SNORING: ICD-10-CM

## 2021-09-14 DIAGNOSIS — G47.30 OBSERVED SLEEP APNEA: Primary | ICD-10-CM

## 2021-09-14 DIAGNOSIS — G47.10 HYPERSOMNIA: Primary | ICD-10-CM

## 2021-09-14 DIAGNOSIS — G47.33 MODERATE OBSTRUCTIVE SLEEP APNEA: ICD-10-CM

## 2021-09-14 DIAGNOSIS — G47.10 HYPERSOMNIA: ICD-10-CM

## 2021-09-14 PROCEDURE — 99204 OFFICE O/P NEW MOD 45 MIN: CPT | Performed by: INTERNAL MEDICINE

## 2021-09-14 ASSESSMENT — SLEEP AND FATIGUE QUESTIONNAIRES
HOW LIKELY ARE YOU TO NOD OFF OR FALL ASLEEP IN A CAR, WHILE STOPPED FOR A FEW MINUTES IN TRAFFIC: 1
HOW LIKELY ARE YOU TO NOD OFF OR FALL ASLEEP WHILE SITTING QUIETLY AFTER LUNCH WITHOUT ALCOHOL: 2
HOW LIKELY ARE YOU TO NOD OFF OR FALL ASLEEP WHEN YOU ARE A PASSENGER IN A CAR FOR AN HOUR WITHOUT A BREAK: 0
HOW LIKELY ARE YOU TO NOD OFF OR FALL ASLEEP WHILE LYING DOWN TO REST IN THE AFTERNOON WHEN CIRCUMSTANCES PERMIT: 3
HOW LIKELY ARE YOU TO NOD OFF OR FALL ASLEEP WHILE WATCHING TV: 3
HOW LIKELY ARE YOU TO NOD OFF OR FALL ASLEEP WHILE SITTING INACTIVE IN A PUBLIC PLACE: 2
ESS TOTAL SCORE: 11
HOW LIKELY ARE YOU TO NOD OFF OR FALL ASLEEP WHILE SITTING AND READING: 0
HOW LIKELY ARE YOU TO NOD OFF OR FALL ASLEEP WHILE SITTING AND TALKING TO SOMEONE: 0

## 2021-09-14 NOTE — PROGRESS NOTES
[facility]                                                          TELEHEALTH EVALUATION: Service performed was Audio/Visual (During YORLS-34 public health emergency) and not a face-to-face visit           CHIEF COMPLAINT: Sleep referral    Consulting provider: KELLI Stuart CNP      HPI:   Patient was diagnosed with moderate DAYNA approximately 1393-5103 years ago in [place]. Associated with snoring, witnessed apnea and hypersomnia. Better with CPAP and worse without CPAP. Did not like the full face mask. Patient was not able to afford supplies and quit few months after using it. His main issue now is daytime fatigue, tiredness and sleepiness. Bedtime 7-10 pm and rise time is 1:30-4 am. Sleeps total of 5-6 hrs during work days and 7-10 hrs on the weekend. Feels better when he sleeps longer. Sleep onset few minutes. Old records were reviewed and summarized by me. Past Medical History:   Diagnosis Date    Hypertension        Past Surgical History:        Procedure Laterality Date    KNEE SURGERY      SHOULDER SURGERY         Allergies:  has No Known Allergies. Social History:    TOBACCO:   reports that he has never smoked. He quit smokeless tobacco use about 3 months ago. His smokeless tobacco use included chew. ETOH:   reports no history of alcohol use.       Family History:       Problem Relation Age of Onset    High Blood Pressure Mother     Heart Surgery Mother         CABG    Diabetes Mother     Heart Attack Mother     Diabetes Sister     Diabetes Brother     Heart Surgery Maternal Grandfather     Diabetes Maternal Grandfather     Heart Attack Maternal Grandfather     Hypertension Brother     Diabetes Brother        Current Medications:    Current Outpatient Medications:     methylPREDNISolone (MEDROL DOSEPACK) 4 MG tablet, Take by mouth., Disp: 1 kit, Rfl: 0    ipratropium-albuterol (DUONEB) 0.5-2.5 (3) MG/3ML SOLN nebulizer solution, Inhale 3 mLs into the lungs every 4 hours, Disp: 360 mL, Rfl: 0    albuterol sulfate HFA (VENTOLIN HFA) 108 (90 Base) MCG/ACT inhaler, Inhale 2 puffs into the lungs 4 times daily as needed for Wheezing, Disp: 1 Inhaler, Rfl: 0    lisinopril-hydroCHLOROthiazide (PRINZIDE;ZESTORETIC) 20-12.5 MG per tablet, Take 2 tablets by mouth daily, Disp: 60 tablet, Rfl: 5    amLODIPine (NORVASC) 5 MG tablet, Take 1 tablet by mouth daily, Disp: 30 tablet, Rfl: 5      REVIEW OF SYSTEMS:  Constitutional: Negative for fever  HENT: Negative for sore throat  Eyes: Negative for redness   Respiratory: Negative for dyspnea, cough  Cardiovascular: Negative for chest pain  Gastrointestinal: Negative for vomiting, diarrhea   Genitourinary: Negative for hematuria   Musculoskeletal: Negative for arthralgias   Skin: Negative for rash  Neurological: Negative for syncope  Hematological: Negative for adenopathy  Psychiatric/Behavorial: Negative for anxiety      Objective:   PHYSICAL EXAM:    There were no vitals taken for this visit.' on RA  O2 Sat:  Mallampati class IV. Constitutional:  No acute distress. Appears well developed and nourished. Eyes: No sclera icterus. EOM intact. No visible discharge. HENT: Head is normocephalic and atraumatic. Mucus membranes are moist and the tongue appears normal. Normal appearing nose. External Ears normal.   Neck: No visualized mass. Shellia Pain is midline   Resp: No accessory muscle use. Respiratory effort normal. No visualized signs of difficulty breathing or respiratory distress. Cardiovascular: No LE edema. Musculoskeletal: Normal gait with no signs of ataxia. Normal range of motion of the neck. Skin: No significant exanthematous lesions or discoloration noted on facial skin    Neuro: Awake. Alert. Able to follow commands. No facial asymmetry. No gaze palsy. Psych: No agitation. Normal affect. No hallucinations. Oriented to person/time/place. No anxiety. Normal judgement and insight. DATA reviewed by me:   TSH 1.91  Cr 1  Hb Hb 16.5    Assessment:       · Hypersomnia and Fatigue  · Snoring  · Observed sleep apnea   · Sleep deprivation   · Moderate DAYNA used CPAP in 2014  · Obesity class  · Essential hypertension        Plan:       [facility] Obtain old records for prior PSG/PAP titration.  CPAP titration if able to fine old PSG, otherwise split night   Nasal mask/Pillow    Discussed with patient the pathophysiology of apnea.  Sleep hygiene- advised to target 7-9 hrs of sleep   Avoid sedatives, alcohol and caffeinated drinks at bed time.  No driving motorized vehicles or operating heavy machinery while fatigue, drowsy or sleepy.  Weight loss is also recommended as a long-term intervention.  Continue blood pressure medications - treatment of DAYNA can lower blood pressure by levels that are clinically significant.  Complications of DAYNA if not treated were discussed with patient patient to include systemic hypertension, pulmonary hypertension, cardiovascular morbidities, car accidents and all cause mortality. Reji Barkley is a 33 y.o. male being evaluated by a Virtual Visit (video visit) encounter to address concerns as mentioned above. A caregiver was present when appropriate. Due to this being a TeleHealth encounter (During \[facility] [place] [facility]\""-78 public health emergency), evaluation of the following organ systems was limited: Vitals/Constitutional/EENT/Resp/CV/GI//MS/Neuro/Skin/Heme-Lymph-Imm. Pursuant to the emergency declaration under the [facility], [address] [place] [address] waiver authority and the [employer] and [employer] Act, this Virtual Visit was conducted with patient's (and/or legal guardian's) consent, to reduce the patient's risk of exposure to COVID-19 and provide necessary medical care.   The patient (and/or legal guardian) has also been advised to contact this office for worsening conditions or problems, and seek emergency medical treatment and/or call 911 if deemed necessary. Services were provided through a video synchronous discussion virtually to substitute for in-person clinic visit. Patient was located in her home, provider was located in his office. --Bessie Porter MD on 9/14/2021 at 8:45 AM    An electronic signature was used to authenticate this note.

## 2021-09-14 NOTE — LETTER
[facility] Pulmonary, Critical Care, and Sleep  [address] [facility] [address] [place] [address] [address]  Phone: [phone]  Fax: [phone]           Tracy Joyner MD      September 14, 2021     Patient: Gris Devine   MR Number: <[id number]>   YOB: 1990   Date of Visit: 9/14/2021       Dear Dr. Yue Deshpande: Thank you for referring Gris Devine to me for evaluation/treatment. Below are the relevant portions of my assessment and plan of care. If you have questions, please do not hesitate to call me. I look forward to following Ilsa Nugent along with you.     Sincerely,        Tracy Joyner MD    CC providers:  KELLI Dwyer - CNP  [address] [address]  [place]

## 2021-09-14 NOTE — TELEPHONE ENCOUNTER
Need to obtain old records from psg/pap titration. Patient was supposed to have records faxed to our office prior to appt however office has not yet received. Left message asking patient to return call to office. Also need to find out what DME pt uses    LOV: 9/14/21    Assessment:       · Hypersomnia and Fatigue  · Snoring  · Observed sleep apnea   · Sleep deprivation   · Moderate DAYNA used CPAP in 2014  · Obesity class  · Essential hypertension         Plan:       · Obtain old records for prior PSG/PAP titration. · CPAP titration if able to fine old PSG, otherwise split night  · Nasal mask/Pillow   · Discussed with patient the pathophysiology of apnea. · Sleep hygiene- advised to target 7-9 hrs of sleep  · Avoid sedatives, alcohol and caffeinated drinks at bed time. · No driving motorized vehicles or operating heavy machinery while fatigue, drowsy or sleepy. · Weight loss is also recommended as a long-term intervention. · Continue blood pressure medications - treatment of DAYNA can lower blood pressure by levels that are clinically significant.    · Complications of DAYNA if not treated were discussed with patient patient to include systemic hypertension, pulmonary hypertension, cardiovascular morbidities, car accidents and all cause mortality.

## 2021-09-17 NOTE — TELEPHONE ENCOUNTER
Spoke with pt whom states that he thinks the doctor was Dr. Kings Dickinson in [place], [place]. Called the office of Dr. Renee Wolfe, whom does not have record of the pt. Called [facility] to check with medical records [phone], but no answer, will try back at another time.

## 2021-09-29 NOTE — TELEPHONE ENCOUNTER
Office received IP notes from hospital but unable to find sleep records. Patient transferred to sleep center to schedule split night. Please sign order.

## 2021-10-01 NOTE — TELEPHONE ENCOUNTER
Patient has insurance change 10/14/21; wants to wait until after the insurance change.  Will follow up

## 2021-10-21 NOTE — TELEPHONE ENCOUNTER
Spoke with patient whom states that he has not yet received his new cards and not sure if the new insurance has became in effect yet states he will follow up with HR.  Robin SPENCER in 1 week

## 2021-10-29 NOTE — TELEPHONE ENCOUNTER
Patient still has not had insurance transfer he's not sure when this will become effective. Patient states he will CB to schedule the sleep study. Patient was also given sleep center number to schedule.

## 2021-11-30 ENCOUNTER — PATIENT MESSAGE (OUTPATIENT)
Dept: FAMILY MEDICINE CLINIC | Age: 31
End: 2021-11-30

## 2021-11-30 NOTE — TELEPHONE ENCOUNTER
From: Anabelle Fischer  To: Alisa Dean  Sent: 11/30/2021 1:45 PM EST  Subject: Physical for my surgery     Hey [facility] Im having surgery on my right shoulder Dec 14. I have a physical form that has to be filled out before I can get my surgery. Is this something I can drop off and let you fill it out or do I need to make a appointment? ? I tried to make an appointment but nothing is open before my surgery according to online.  Thanks

## 2021-12-03 ENCOUNTER — OFFICE VISIT (OUTPATIENT)
Dept: FAMILY MEDICINE CLINIC | Age: 31
End: 2021-12-03
Payer: COMMERCIAL

## 2021-12-03 ENCOUNTER — HOSPITAL ENCOUNTER (EMERGENCY)
Age: 31
Discharge: HOME | End: 2021-12-03
Payer: COMMERCIAL

## 2021-12-03 ENCOUNTER — APPOINTMENT (OUTPATIENT)
Dept: CT IMAGING | Age: 31
End: 2021-12-03
Payer: COMMERCIAL

## 2021-12-03 VITALS
WEIGHT: 258 LBS | DIASTOLIC BLOOD PRESSURE: 84 MMHG | TEMPERATURE: 97.1 F | RESPIRATION RATE: 16 BRPM | SYSTOLIC BLOOD PRESSURE: 120 MMHG | HEART RATE: 89 BPM | OXYGEN SATURATION: 97 % | BODY MASS INDEX: 37.02 KG/M2

## 2021-12-03 VITALS
HEART RATE: 96 BPM | SYSTOLIC BLOOD PRESSURE: 139 MMHG | WEIGHT: 275 LBS | RESPIRATION RATE: 16 BRPM | BODY MASS INDEX: 39.46 KG/M2 | TEMPERATURE: 98.2 F | OXYGEN SATURATION: 100 % | DIASTOLIC BLOOD PRESSURE: 95 MMHG

## 2021-12-03 DIAGNOSIS — R10.9 RIGHT SIDED ABDOMINAL PAIN: Primary | ICD-10-CM

## 2021-12-03 DIAGNOSIS — R10.31 RIGHT LOWER QUADRANT PAIN: Primary | ICD-10-CM

## 2021-12-03 LAB
A/G RATIO: 1.4 (ref 1.1–2.2)
ALBUMIN SERPL-MCNC: 4.7 G/DL (ref 3.4–5)
ALP BLD-CCNC: 67 U/L (ref 40–129)
ALT SERPL-CCNC: 44 U/L (ref 10–40)
ANION GAP SERPL CALCULATED.3IONS-SCNC: 12 MMOL/L (ref 3–16)
AST SERPL-CCNC: 20 U/L (ref 15–37)
BASOPHILS ABSOLUTE: 0 K/UL (ref 0–0.2)
BASOPHILS RELATIVE PERCENT: 0.4 %
BILIRUB SERPL-MCNC: 0.3 MG/DL (ref 0–1)
BILIRUBIN URINE: NEGATIVE
BLOOD, URINE: NEGATIVE
BUN BLDV-MCNC: 16 MG/DL (ref 7–20)
CALCIUM SERPL-MCNC: 9.6 MG/DL (ref 8.3–10.6)
CHLORIDE BLD-SCNC: 100 MMOL/L (ref 99–110)
CLARITY: CLEAR
CO2: 25 MMOL/L (ref 21–32)
COLOR: YELLOW
CREAT SERPL-MCNC: 0.8 MG/DL (ref 0.9–1.3)
EOSINOPHILS ABSOLUTE: 0.1 K/UL (ref 0–0.6)
EOSINOPHILS RELATIVE PERCENT: 1.4 %
GFR AFRICAN AMERICAN: >60
GFR NON-AFRICAN AMERICAN: >60
GLUCOSE BLD-MCNC: 90 MG/DL (ref 70–99)
GLUCOSE URINE: NEGATIVE MG/DL
HCT VFR BLD CALC: 45.4 % (ref 40.5–52.5)
HEMOGLOBIN: 15.8 G/DL (ref 13.5–17.5)
KETONES, URINE: NEGATIVE MG/DL
LEUKOCYTE ESTERASE, URINE: NEGATIVE
LIPASE: 22 U/L (ref 13–60)
LYMPHOCYTES ABSOLUTE: 2 K/UL (ref 1–5.1)
LYMPHOCYTES RELATIVE PERCENT: 18.7 %
MCH RBC QN AUTO: 29.1 PG (ref 26–34)
MCHC RBC AUTO-ENTMCNC: 34.9 G/DL (ref 31–36)
MCV RBC AUTO: 83.4 FL (ref 80–100)
MICROSCOPIC EXAMINATION: NORMAL
MONOCYTES ABSOLUTE: 0.9 K/UL (ref 0–1.3)
MONOCYTES RELATIVE PERCENT: 8.6 %
NEUTROPHILS ABSOLUTE: 7.6 K/UL (ref 1.7–7.7)
NEUTROPHILS RELATIVE PERCENT: 70.9 %
NITRITE, URINE: NEGATIVE
PDW BLD-RTO: 13.3 % (ref 12.4–15.4)
PH UA: 6 (ref 5–8)
PLATELET # BLD: 308 K/UL (ref 135–450)
PMV BLD AUTO: 7.2 FL (ref 5–10.5)
POTASSIUM SERPL-SCNC: 4.2 MMOL/L (ref 3.5–5.1)
PROTEIN UA: NEGATIVE MG/DL
RBC # BLD: 5.44 M/UL (ref 4.2–5.9)
SODIUM BLD-SCNC: 137 MMOL/L (ref 136–145)
SPECIFIC GRAVITY UA: 1.02 (ref 1–1.03)
TOTAL PROTEIN: 8.1 G/DL (ref 6.4–8.2)
URINE TYPE: NORMAL
UROBILINOGEN, URINE: 0.2 E.U./DL
WBC # BLD: 10.7 K/UL (ref 4–11)

## 2021-12-03 PROCEDURE — 81003 URINALYSIS AUTO W/O SCOPE: CPT

## 2021-12-03 PROCEDURE — 83690 ASSAY OF LIPASE: CPT

## 2021-12-03 PROCEDURE — G8417 CALC BMI ABV UP PARAM F/U: HCPCS | Performed by: NURSE PRACTITIONER

## 2021-12-03 PROCEDURE — 96361 HYDRATE IV INFUSION ADD-ON: CPT

## 2021-12-03 PROCEDURE — 2580000003 HC RX 258: Performed by: PHYSICIAN ASSISTANT

## 2021-12-03 PROCEDURE — 6370000000 HC RX 637 (ALT 250 FOR IP): Performed by: PHYSICIAN ASSISTANT

## 2021-12-03 PROCEDURE — 99213 OFFICE O/P EST LOW 20 MIN: CPT | Performed by: NURSE PRACTITIONER

## 2021-12-03 PROCEDURE — 99285 EMERGENCY DEPT VISIT HI MDM: CPT

## 2021-12-03 PROCEDURE — 74177 CT ABD & PELVIS W/CONTRAST: CPT

## 2021-12-03 PROCEDURE — 80053 COMPREHEN METABOLIC PANEL: CPT

## 2021-12-03 PROCEDURE — 6360000002 HC RX W HCPCS: Performed by: PHYSICIAN ASSISTANT

## 2021-12-03 PROCEDURE — 85025 COMPLETE CBC W/AUTO DIFF WBC: CPT

## 2021-12-03 PROCEDURE — G8427 DOCREV CUR MEDS BY ELIG CLIN: HCPCS | Performed by: NURSE PRACTITIONER

## 2021-12-03 PROCEDURE — 6360000004 HC RX CONTRAST MEDICATION: Performed by: PHYSICIAN ASSISTANT

## 2021-12-03 PROCEDURE — 1036F TOBACCO NON-USER: CPT | Performed by: NURSE PRACTITIONER

## 2021-12-03 PROCEDURE — 96374 THER/PROPH/DIAG INJ IV PUSH: CPT

## 2021-12-03 PROCEDURE — G8484 FLU IMMUNIZE NO ADMIN: HCPCS | Performed by: NURSE PRACTITIONER

## 2021-12-03 RX ORDER — NAPROXEN 500 MG/1
500 TABLET ORAL 2 TIMES DAILY
Qty: 30 TABLET | Refills: 0 | Status: SHIPPED | OUTPATIENT
Start: 2021-12-03 | End: 2021-12-10

## 2021-12-03 RX ORDER — ONDANSETRON 4 MG/1
4 TABLET, ORALLY DISINTEGRATING ORAL EVERY 8 HOURS PRN
Qty: 20 TABLET | Refills: 0 | Status: SHIPPED | OUTPATIENT
Start: 2021-12-03 | End: 2021-12-10

## 2021-12-03 RX ORDER — ONDANSETRON 4 MG/1
4 TABLET, ORALLY DISINTEGRATING ORAL ONCE
Status: COMPLETED | OUTPATIENT
Start: 2021-12-03 | End: 2021-12-03

## 2021-12-03 RX ORDER — MORPHINE SULFATE 4 MG/ML
4 INJECTION, SOLUTION INTRAMUSCULAR; INTRAVENOUS ONCE
Status: COMPLETED | OUTPATIENT
Start: 2021-12-03 | End: 2021-12-03

## 2021-12-03 RX ORDER — 0.9 % SODIUM CHLORIDE 0.9 %
1000 INTRAVENOUS SOLUTION INTRAVENOUS ONCE
Status: COMPLETED | OUTPATIENT
Start: 2021-12-03 | End: 2021-12-03

## 2021-12-03 RX ADMIN — IOPAMIDOL 75 ML: 755 INJECTION, SOLUTION INTRAVENOUS at 15:56

## 2021-12-03 RX ADMIN — SODIUM CHLORIDE 1000 ML: 9 INJECTION, SOLUTION INTRAVENOUS at 13:03

## 2021-12-03 RX ADMIN — MORPHINE SULFATE 4 MG: 4 INJECTION INTRAVENOUS at 13:03

## 2021-12-03 RX ADMIN — ONDANSETRON 4 MG: 4 TABLET, ORALLY DISINTEGRATING ORAL at 13:03

## 2021-12-03 ASSESSMENT — PATIENT HEALTH QUESTIONNAIRE - PHQ9
SUM OF ALL RESPONSES TO PHQ QUESTIONS 1-9: 0
2. FEELING DOWN, DEPRESSED OR HOPELESS: 0
SUM OF ALL RESPONSES TO PHQ QUESTIONS 1-9: 0
1. LITTLE INTEREST OR PLEASURE IN DOING THINGS: 0
SUM OF ALL RESPONSES TO PHQ9 QUESTIONS 1 & 2: 0
SUM OF ALL RESPONSES TO PHQ QUESTIONS 1-9: 0

## 2021-12-03 ASSESSMENT — PAIN DESCRIPTION - LOCATION
LOCATION: ABDOMEN
LOCATION: ABDOMEN

## 2021-12-03 ASSESSMENT — ENCOUNTER SYMPTOMS
NAUSEA: 0
SHORTNESS OF BREATH: 0
COUGH: 0
DIARRHEA: 0
ABDOMINAL PAIN: 1
VOMITING: 0

## 2021-12-03 ASSESSMENT — PAIN DESCRIPTION - PAIN TYPE
TYPE: ACUTE PAIN
TYPE: ACUTE PAIN

## 2021-12-03 ASSESSMENT — PAIN SCALES - GENERAL
PAINLEVEL_OUTOF10: 4
PAINLEVEL_OUTOF10: 4
PAINLEVEL_OUTOF10: 3

## 2021-12-03 NOTE — PROGRESS NOTES
12/3/2021     Chief Complaint   Patient presents with    Abdominal Pain     past three or 4 days  Right side of abdomin     Amanda Ferrell (:  1990) is a 33 y.o. male, here for evaluation of the following medical concerns:    PEARL Gudino is here today with complaints of right sided abdominal pain. Symptoms started 4 days ago. Radiating from right abdomen to umbilicus. Pain is constant, sharp, stabbing. Pain is worsened with certain movements, laying on the right side, sometimes worsens with food intake, walking or moving a lot. He has had some episodes of nausea without vomiting. Denies any fever. Pain has been gradually worsening, today worse. Bowels are moving normally. Thought initially pain was related to previous hernia surgery    Review of Systems   Constitutional: Negative for fatigue and fever. Respiratory: Negative for cough and shortness of breath. Cardiovascular: Negative for chest pain and leg swelling. Gastrointestinal: Positive for abdominal pain. Negative for diarrhea, nausea and vomiting. Neurological: Negative for dizziness and headaches. Prior to Visit Medications    Medication Sig Taking?  Authorizing Provider   amLODIPine (NORVASC) 5 MG tablet Take 1 tablet by mouth once daily Yes KELLI Mccall - SRIKANTH   lisinopril-hydroCHLOROthiazide (PRINZIDE;ZESTORETIC) 20-12.5 MG per tablet Take 2 tablets by mouth daily Yes KELLI Mccall CNP   ipratropium-albuterol (DUONEB) 0.5-2.5 (3) MG/3ML SOLN nebulizer solution Inhale 3 mLs into the lungs every 4 hours  Patient not taking: Reported on 12/3/2021  KELLI Delcid CNP   albuterol sulfate HFA (VENTOLIN HFA) 108 (90 Base) MCG/ACT inhaler Inhale 2 puffs into the lungs 4 times daily as needed for Wheezing  Patient not taking: Reported on 12/3/2021  KELLI Mccall - SRIKANTH        Social History     Tobacco Use    Smoking status: Never Smoker    Smokeless tobacco: Former User Types: Chew   Substance Use Topics    Alcohol use: Never        Vitals:    12/03/21 1058   BP: 120/84   Site: Left Upper Arm   Position: Sitting   Pulse: 89   Resp: 16   Temp: 97.1 °F (36.2 °C)   TempSrc: Temporal   SpO2: 97%   Weight: 258 lb (117 kg)     Estimated body mass index is 37.02 kg/m² as calculated from the following:    Height as of 7/1/21: 5' 10\" (1.778 m). Weight as of this encounter: 258 lb (117 kg). Physical Exam  Vitals and nursing note reviewed. Constitutional:       General: He is not in acute distress. Appearance: Normal appearance. He is obese. He is not ill-appearing, toxic-appearing or diaphoretic. Cardiovascular:      Rate and Rhythm: Normal rate and regular rhythm. Heart sounds: Normal heart sounds. No murmur heard. Pulmonary:      Effort: Pulmonary effort is normal. No respiratory distress. Breath sounds: Normal breath sounds. No rales. Abdominal:      General: There is no distension. Palpations: Abdomen is soft. There is no mass. Tenderness: There is abdominal tenderness. There is no guarding or rebound. Positive signs include Rovsing's sign and McBurney's sign. Negative signs include Chance's sign. Hernia: No hernia is present. Comments: Pain right lower quadrant with palpation over entire area. Pain with light touch. Movement pain. Looks to be uncomfortable   Neurological:      Mental Status: He is alert. ASSESSMENT/PLAN:  1. Right lower quadrant pain  - referred to ED for further evaluation of pain. He appears to be quite uncomfortable. R/o appendicitis. An electronic signature was used to authenticate this note.     --Torin Benitez, KELLI - CNP on 12/3/2021 at 1:08 PM

## 2021-12-03 NOTE — ED PROVIDER NOTES
[facility] Emergency Department    CHIEF COMPLAINT  Abdominal Pain (patient states intermittent abdominal pain x2 weeks. States pain radiated to right side 4 days ago and has remained there.) and Nausea      SHARED SERVICE VISIT  Evaluated by KENTON. My supervising physician was available for consultation. HISTORY OF PRESENT ILLNESS  José Domínguez is a 33 y.o. male who presents to the ED complaining of 2-week history of abdominal pain. Reports that it has been getting progressively worse. Seen and evaluated by PCP today who sent patient over to rule out an appendicitis. Again pain ongoing for 2 weeks now. Does appear to be associated with eating fatty foods. Mild back pain as well. Current symptoms rated at a 8 out of 10. Has had nausea without vomiting. No diarrhea or constipation. No black or bloody stools. History of hernia repair. He has noted no urinary symptoms. No pain in the testicle. Denies chest pain shortness of breath. No headache, lightheadedness, dizziness confusion. No other complaints, modifying factors or associated symptoms. Nursing notes reviewed.    Past Medical History:   Diagnosis Date    Hypertension      Past Surgical History:   Procedure Laterality Date    KNEE SURGERY      SHOULDER SURGERY       Family History   Problem Relation Age of Onset    High Blood Pressure Mother     Heart Surgery Mother         CABG    Diabetes Mother     Heart Attack Mother     Diabetes Sister     Diabetes Brother     Heart Surgery Maternal Grandfather     Diabetes Maternal Grandfather     Heart Attack Maternal Grandfather     Hypertension Brother     Diabetes Brother      Social History     Socioeconomic History    Marital status: Single     Spouse name: Not on file    Number of children: Not on file    Years of education: Not on file    Highest education level: Not on file   Occupational History    Not on file   Tobacco Use    Smoking status: Never Smoker    Smokeless tobacco: Former User     Types: Chew   Vaping Use    Vaping Use: Never used   Substance and Sexual Activity    Alcohol use: Never    Drug use: Never    Sexual activity: Yes     Partners: Female   Other Topics Concern    Not on file   Social History Narrative    Not on file     Social Determinants of Health     Financial Resource Strain: Low Risk     Difficulty of Paying Living Expenses: Not hard at all   Food Insecurity: No Food Insecurity    Worried About Running Out of Food in the Last Year: Never true    [place] of Food in the Last Year: Never true   Transportation Needs: No Transportation Needs    Lack of Transportation (Medical): No    Lack of Transportation (Non-Medical):  No   Physical Activity:     Days of Exercise per Week: Not on file    Minutes of Exercise per Session: Not on file   Stress:     Feeling of Stress : Not on file   Social Connections:     Frequency of Communication with Friends and Family: Not on file    Frequency of Social Gatherings with Friends and Family: Not on file    Attends Denominational Services: Not on file    Active Member of [address] [place] [address] or Organizations: Not on file    Attends Club or Organization Meetings: Not on file    Marital Status: Not on file   Intimate Partner Violence:     Fear of Current or Ex-Partner: Not on file    Emotionally Abused: Not on file    Physically Abused: Not on file    Sexually Abused: Not on file   Housing Stability:     Unable to Pay for Housing in the Last Year: Not on file    Number of [place] in the Last Year: Not on file    Unstable Housing in the Last Year: Not on file     Current Facility-Administered Medications   Medication Dose Route Frequency Provider Last Rate Last Admin    0.9 % sodium chloride bolus  1,000 mL IntraVENous Once Jose Luis Mcgarry, [address]        morphine sulfate (PF) injection 4 mg  4 mg IntraVENous Once Jose Luis Mcgarry [address]        ondansetron (ZOFRAN-ODT) disintegrating tablet 4 mg  4 mg Oral Once [facility] [place]         Current Outpatient Medications   Medication Sig Dispense Refill    amLODIPine (NORVASC) 5 MG tablet Take 1 tablet by mouth once daily 30 tablet 5    ipratropium-albuterol (DUONEB) 0.5-2.5 (3) MG/3ML SOLN nebulizer solution Inhale 3 mLs into the lungs every 4 hours (Patient not taking: Reported on 12/3/2021) 360 mL 0    albuterol sulfate HFA (VENTOLIN HFA) 108 (90 Base) MCG/ACT inhaler Inhale 2 puffs into the lungs 4 times daily as needed for Wheezing (Patient not taking: Reported on 12/3/2021) 1 Inhaler 0    lisinopril-hydroCHLOROthiazide (PRINZIDE;ZESTORETIC) 20-12.5 MG per tablet Take 2 tablets by mouth daily 60 tablet 5     No Known Allergies    REVIEW OF SYSTEMS  10 systems reviewed, pertinent positives per HPI otherwise noted to be negative    PHYSICAL EXAM  BP (!) 150/88   Pulse 94   Temp 98.2 °F (36.8 °C) (Oral)   Resp 17   Wt 275 lb (124.7 kg)   SpO2 98%   BMI 39.46 kg/m²   GENERAL APPEARANCE: Awake and alert. Cooperative. No acute distress. HEAD: Normocephalic. Atraumatic. EYES: PERRL. EOM's grossly intact. ENT: Mucous membranes are moist.   NECK: Supple. HEART: RRR. No murmurs. LUNGS: Respirations unlabored. CTAB. Good air exchange. Speaking comfortably in full sentences. ABDOMEN: Soft. Non-distended. Right-sided abdominal tenderness without rigidity, guarding or rebound. Patient with mildly positive Chance's. Tenderness over McBurney's with negative Rovsing's or heel strike. No obvious hernias or masses. Bowel sounds normal in all quadrants. Mild right CVA tenderness. EXTREMITIES: No peripheral edema. Moves all extremities equally. All extremities neurovascularly intact. SKIN: Warm and dry. No acute rashes. NEUROLOGICAL: Alert and oriented. CN's 2-12 intact. No gross facial drooping. Strength 5/5, sensation intact. PSYCHIATRIC: Normal mood and affect.     RADIOLOGY  CT ABDOMEN PELVIS W IV CONTRAST Additional Contrast? None    Result Date: 12/3/2021  EXAMINATION: CT OF THE ABDOMEN AND PELVIS WITH CONTRAST 12/3/2021 3:46 pm TECHNIQUE: CT of the abdomen and pelvis was performed with the administration of intravenous contrast. Multiplanar reformatted images are provided for review. Dose modulation, iterative reconstruction, and/or weight based adjustment of the mA/kV was utilized to reduce the radiation dose to as low as reasonably achievable. COMPARISON: None. HISTORY: ORDERING SYSTEM PROVIDED HISTORY: abd pain TECHNOLOGIST PROVIDED HISTORY: Reason for exam:->abd pain Additional Contrast?->None Decision Support Exception - unselect if not a suspected or confirmed emergency medical condition->Emergency Medical Condition (MA) Reason for Exam: right lower abdominal pain, nausea Acuity: Acute Type of Exam: Initial FINDINGS: Lower Chest: Mosaic attenuation in the lung bases. Organs: The superior-most dome of the liver is omitted by positioning. There is fatty infiltration of the liver. The gallbladder, pancreas, spleen and adrenal glands are unremarkable. The kidneys enhance symmetrically. There is no hydronephrosis. GI/Bowel: The stomach and small bowel are unremarkable. Appendix is normal. There is diverticulosis of the descending and sigmoid colon, lacking acute inflammatory changes. The mesenteric vasculature enhances in normal fashion. Pelvis: Urinary bladder and prostate gland are unremarkable. Peritoneum/Retroperitoneum: There is no adenopathy or aneurysm. Bones/Soft Tissues: No acute osseous abnormality is identified. Soft tissues of the abdominal wall are unremarkable. No acute abdominopelvic abnormality. Normal appendix. Hepatic steatosis. Nonspecific mosaic attenuation in the lung bases, air trapping/atelectasis versus small airway disease. ED COURSE  Patient received morphine and Zofran IV for pain and nausea, with good relief. Triage vitals stable. Urinalysis without evidence for infectious process.   No blood noted.  CBC without leukocytosis or anemia. CMP unremarkable. Lipase normal.  CT abdomen pelvis without evidence for acute pathologic process. Patient appears to have fatty liver disease. The appendix was normal and there is no evidence of obstructive uropathy. On reevaluation patient has had some improvement of symptoms. Did perform testicular ultrasound at this time and patient continues to lack any testicular pain with normal cremasteric reflex and no masses present. Patient will be discharged home with medications for symptomatic treatment. Have also discussed return precautions and recommendations for reexamining of the abdomen within 12 to 24 hours. Patient is in agreement and comfortable at discharge. A discussion was had with Mr. Chester Crooks regarding abdominal pain, ED findings and recommendations for follow-up. Risk management discussed and shared decision making had with patient and/or surrogate. All questions were answered. Patient will follow up with PCP within 1 day for further evaluation/treatment. All questions answered. Patient will return to ED for new/worsening symptoms. Patient was sent home with a prescription for naproxen and Zofran.     MDM  Results for orders placed or performed during the hospital encounter of 12/03/21   CBC auto differential   Result Value Ref Range    WBC 10.7 4.0 - 11.0 K/uL    RBC 5.44 4.20 - 5.90 M/uL    Hemoglobin 15.8 13.5 - 17.5 g/dL    Hematocrit 45.4 40.5 - 52.5 %    MCV 83.4 80.0 - 100.0 fL    MCH 29.1 26.0 - 34.0 pg    MCHC 34.9 31.0 - 36.0 g/dL    RDW 13.3 12.4 - 15.4 %    Platelets 819 240 - 783 K/uL    MPV 7.2 5.0 - 10.5 fL    Neutrophils % 70.9 %    Lymphocytes % 18.7 %    Monocytes % 8.6 %    Eosinophils % 1.4 %    Basophils % 0.4 %    Neutrophils Absolute 7.6 1.7 - 7.7 K/uL    Lymphocytes Absolute 2.0 1.0 - 5.1 K/uL    Monocytes Absolute 0.9 0.0 - 1.3 K/uL    Eosinophils Absolute 0.1 0.0 - 0.6 K/uL    Basophils Absolute 0.0 0.0 - 0.2 K/uL Comprehensive metabolic panel   Result Value Ref Range    Sodium 137 136 - 145 mmol/L    Potassium 4.2 3.5 - 5.1 mmol/L    Chloride 100 99 - 110 mmol/L    CO2 25 21 - 32 mmol/L    Anion Gap 12 3 - 16    Glucose 90 70 - 99 mg/dL    BUN 16 7 - 20 mg/dL    CREATININE 0.8 (L) 0.9 - 1.3 mg/dL    GFR Non-African American >60 >60    GFR African American >60 >60    Calcium 9.6 8.3 - 10.6 mg/dL    Total Protein 8.1 6.4 - 8.2 g/dL    Albumin 4.7 3.4 - 5.0 g/dL    Albumin/Globulin Ratio 1.4 1.1 - 2.2    Total Bilirubin 0.3 0.0 - 1.0 mg/dL    Alkaline Phosphatase 67 40 - 129 U/L    ALT 44 (H) 10 - 40 U/L    AST 20 15 - 37 U/L   Lipase   Result Value Ref Range    Lipase 22.0 13.0 - 60.0 U/L   Urinalysis   Result Value Ref Range    Color, UA Yellow Straw/Yellow    Clarity, UA Clear Clear    Glucose, Ur Negative Negative mg/dL    Bilirubin Urine Negative Negative    Ketones, Urine Negative Negative mg/dL    Specific Gravity, UA 1.025 1.005 - 1.030    Blood, Urine Negative Negative    pH, UA 6.0 5.0 - 8.0    Protein, UA Negative Negative mg/dL    Urobilinogen, Urine 0.2 <2.0 E.U./dL    Nitrite, Urine Negative Negative    Leukocyte Esterase, Urine Negative Negative    Microscopic Examination Not Indicated     Urine Type NotGiven      I estimate there is LOW risk for ACUTE APPENDICITIS, BOWEL OBSTRUCTION, CHOLECYSTITIS, DIVERTICULITIS, INCARCERATED HERNIA, PANCREATITIS, or PERFORATED BOWEL or ULCER, thus I consider the discharge disposition reasonable. Also, there is no evidence or peritonitis, sepsis, or toxicity. Judd Guardado and I have discussed the diagnosis and risks, and we agree with discharging home to follow-up with their primary doctor. We also discussed returning to the Emergency Department immediately if new or worsening symptoms occur. We have discussed the symptoms which are most concerning (e.g., bloody stool, fever, changing or worsening pain, vomiting) that necessitate immediate return. FINAL Impression  1. Right sided abdominal pain      Blood pressure (!) 139/95, pulse 96, temperature 98.2 °F (36.8 °C), temperature source Oral, resp. rate 16, weight 275 lb (124.7 kg), SpO2 100 %. DISPOSITION  Patient was discharged to home in good condition.           Gagan Everett  12/06/21 0463

## 2021-12-03 NOTE — ED NOTES
[employer] PA in room performing testicular exam.  This RN in room as chaperone.      Grace Bai, RN  12/03/21 2263

## 2021-12-03 NOTE — ED NOTES
Pt ambulates too and from restroom to provide urine sample. Pt verbalizes pain with movement and ambulation at a 5/10. Pt back to bed. Pt and wife deny needs at this time.      Kesiha Moore RN  12/03/21 0047

## 2021-12-03 NOTE — ED NOTES
Educated pt on x2 prescriptions and discharge paperwork as well as follow-up appointment. Pt verbalizes understanding of all instructions and denies questions. IV discontinued, catheter intact, minimal bleeding at IV site, 2x2 and tape applied, manual pressure held. Pt left ambulatory with significant other with all personal belongings, and discharge paperwork to private residence. Pt in no distress at this time. Prescriptions x2 sent as E-Scripts. Pt instructed on how to  prescriptions. Pt verbalizes understanding.          Wendy Sutton RN  12/03/21 7644

## 2021-12-10 ENCOUNTER — OFFICE VISIT (OUTPATIENT)
Dept: FAMILY MEDICINE CLINIC | Age: 31
End: 2021-12-10
Payer: COMMERCIAL

## 2021-12-10 VITALS
HEART RATE: 97 BPM | WEIGHT: 297 LBS | SYSTOLIC BLOOD PRESSURE: 118 MMHG | DIASTOLIC BLOOD PRESSURE: 80 MMHG | RESPIRATION RATE: 16 BRPM | OXYGEN SATURATION: 98 % | BODY MASS INDEX: 42.62 KG/M2 | TEMPERATURE: 97.1 F

## 2021-12-10 DIAGNOSIS — Z01.818 PREOP EXAMINATION: Primary | ICD-10-CM

## 2021-12-10 DIAGNOSIS — I10 ESSENTIAL HYPERTENSION: ICD-10-CM

## 2021-12-10 DIAGNOSIS — G47.33 OSA (OBSTRUCTIVE SLEEP APNEA): ICD-10-CM

## 2021-12-10 DIAGNOSIS — E66.01 CLASS 3 SEVERE OBESITY DUE TO EXCESS CALORIES WITH SERIOUS COMORBIDITY AND BODY MASS INDEX (BMI) OF 40.0 TO 44.9 IN ADULT (HCC): ICD-10-CM

## 2021-12-10 DIAGNOSIS — M75.81 TENDINITIS OF RIGHT ROTATOR CUFF: ICD-10-CM

## 2021-12-10 PROCEDURE — G8417 CALC BMI ABV UP PARAM F/U: HCPCS | Performed by: NURSE PRACTITIONER

## 2021-12-10 PROCEDURE — G8427 DOCREV CUR MEDS BY ELIG CLIN: HCPCS | Performed by: NURSE PRACTITIONER

## 2021-12-10 PROCEDURE — 99213 OFFICE O/P EST LOW 20 MIN: CPT | Performed by: NURSE PRACTITIONER

## 2021-12-10 PROCEDURE — 1036F TOBACCO NON-USER: CPT | Performed by: NURSE PRACTITIONER

## 2021-12-10 PROCEDURE — G8484 FLU IMMUNIZE NO ADMIN: HCPCS | Performed by: NURSE PRACTITIONER

## 2021-12-10 ASSESSMENT — PATIENT HEALTH QUESTIONNAIRE - PHQ9
SUM OF ALL RESPONSES TO PHQ QUESTIONS 1-9: 0
SUM OF ALL RESPONSES TO PHQ QUESTIONS 1-9: 0
1. LITTLE INTEREST OR PLEASURE IN DOING THINGS: 0
2. FEELING DOWN, DEPRESSED OR HOPELESS: 0
SUM OF ALL RESPONSES TO PHQ9 QUESTIONS 1 & 2: 0
SUM OF ALL RESPONSES TO PHQ QUESTIONS 1-9: 0

## 2021-12-10 ASSESSMENT — ENCOUNTER SYMPTOMS
COUGH: 0
DIARRHEA: 0
VOMITING: 0
SHORTNESS OF BREATH: 0
NAUSEA: 0

## 2021-12-10 NOTE — PROGRESS NOTES
[facility]   Telephone: [phone]  Fax: [phone]  Preoperative History & Physical        DIAGNOSIS: Tendinitis of right rotator cuff    PROCEDURE: Right shoulder arthroscopy      History Obtained From:  patient    HISTORY OF PRESENT ILLNESS:    The patient is a 33 y.o. male with significant past medical history of hypertension, fatty liver, obesity, obstructive sleep apnea, cardiomegaly who presents for preoperative examination for above procedure. Surgery is scheduled for 12/14/2021  With Dr. Daya Parish at [facility] surgery center. Shoulder pain has been present for 3 months, injured at work. Has failed PT. Past Medical History:   Diagnosis Date    Hypertension      Past Surgical History:   Procedure Laterality Date    KNEE SURGERY      SHOULDER SURGERY       Current Outpatient Medications   Medication Sig Dispense Refill    amLODIPine (NORVASC) 5 MG tablet Take 1 tablet by mouth once daily 30 tablet 5    lisinopril-hydroCHLOROthiazide (PRINZIDE;ZESTORETIC) 20-12.5 MG per tablet Take 2 tablets by mouth daily 60 tablet 5    naproxen (NAPROSYN) 500 MG tablet Take 1 tablet by mouth 2 times daily 30 tablet 0    ondansetron (ZOFRAN ODT) 4 MG disintegrating tablet Take 1 tablet by mouth every 8 hours as needed for Nausea or Vomiting 20 tablet 0    ipratropium-albuterol (DUONEB) 0.5-2.5 (3) MG/3ML SOLN nebulizer solution Inhale 3 mLs into the lungs every 4 hours (Patient not taking: Reported on 12/3/2021) 360 mL 0    albuterol sulfate HFA (VENTOLIN HFA) 108 (90 Base) MCG/ACT inhaler Inhale 2 puffs into the lungs 4 times daily as needed for Wheezing (Patient not taking: Reported on 12/3/2021) 1 Inhaler 0     No current facility-administered medications for this visit. Allergies:  Patient has no known allergies.   History of allergic reaction to anesthesia:  No  Planned anesthesia: General   Known anesthesia problems: None   Bleeding risk: No recent or remote history of abnormal Cranial Nerves: No cranial nerve deficit. DATA:  EKG:  NONE    CBC with Differential:    Lab Results   Component Value Date    WBC 10.7 12/03/2021    RBC 5.44 12/03/2021    HGB 15.8 12/03/2021    HCT 45.4 12/03/2021     12/03/2021    MCV 83.4 12/03/2021    MCH 29.1 12/03/2021    MCHC 34.9 12/03/2021    RDW 13.3 12/03/2021    METASPCT 2 07/01/2021    LYMPHOPCT 18.7 12/03/2021    MONOPCT 8.6 12/03/2021    BASOPCT 0.4 12/03/2021    MONOSABS 0.9 12/03/2021    LYMPHSABS 2.0 12/03/2021    EOSABS 0.1 12/03/2021    BASOSABS 0.0 12/03/2021     CMP:    Lab Results   Component Value Date     12/03/2021    K 4.2 12/03/2021     12/03/2021    CO2 25 12/03/2021    BUN 16 12/03/2021    CREATININE 0.8 12/03/2021    GFRAA >60 12/03/2021    AGRATIO 1.4 12/03/2021    LABGLOM >60 12/03/2021    GLUCOSE 90 12/03/2021    PROT 8.1 12/03/2021    LABALBU 4.7 12/03/2021    CALCIUM 9.6 12/03/2021    BILITOT 0.3 12/03/2021    ALKPHOS 67 12/03/2021    AST 20 12/03/2021    ALT 44 12/03/2021       ASSESSMENT AND PLAN:  1. Preop examination    2. Tendinitis of right rotator cuff    3. DAYNA (obstructive sleep apnea)    4. Essential hypertension    5. Class 3 severe obesity due to excess calories with serious comorbidity and body mass index (BMI) of 40.0 to 44.9 in adult ([facility]    1. Preoperative workup as follows: none  2. Change in medication regimen before surgery: Discontinue NSAIDs (Naprosyn) 7 days before surgery  3.  Prophylaxis for cardiac events with perioperative beta-blockers: Not indicated  ACC/AHA indications for pre-operative beta-blocker use:    · Vascular surgery with history of postitive stress test  · Intermediate or high risk surgery with history of CAD   · Intermediate or high risk surgery with multiple clinical predictors of CAD- 2 of the following: history of compensated or prior heart failure, history of cerebrovascular disease, DM, or renal insufficiency    Routine administration of higher-dose, long-acting metoprolol in beta-blocker-naïve patients on the day of surgery, and in the absence of dose titration is associated with an overall increase in mortality. Beta-blockers should be started days to weeks prior to surgery and titrated to pulse < 70.  4. Deep vein thrombosis prophylaxis: regimen to be chosen by surgical team  5. No contraindications to planned surgery      KELLI Hernandez CNP, CNP    MMA [place].  [address] [place] [address]  [phone]

## 2022-01-10 ENCOUNTER — TELEPHONE (OUTPATIENT)
Dept: PULMONOLOGY | Age: 32
End: 2022-01-10

## 2022-01-10 DIAGNOSIS — R06.83 SNORING: ICD-10-CM

## 2022-01-10 DIAGNOSIS — R53.83 FATIGUE, UNSPECIFIED TYPE: ICD-10-CM

## 2022-01-10 DIAGNOSIS — G47.10 HYPERSOMNIA: ICD-10-CM

## 2022-01-10 DIAGNOSIS — Z72.820 SLEEP DEPRIVATION: ICD-10-CM

## 2022-01-10 DIAGNOSIS — G47.30 OBSERVED SLEEP APNEA: ICD-10-CM

## 2022-01-10 DIAGNOSIS — G47.33 MODERATE OBSTRUCTIVE SLEEP APNEA: Primary | ICD-10-CM

## 2022-01-10 NOTE — TELEPHONE ENCOUNTER
Patient informed transferred to sleep center to schedule. Will follow up to ensure patient schedules.  Please sign HST order

## 2022-01-10 NOTE — TELEPHONE ENCOUNTER
----- Message from Dane Sarkar MA sent at 1/10/2022  7:43 AM EST -----  Regarding: [facility] has denied the in-lab split night study due to not being medically necessary. If you do not agree with this determination a peer to peer can be completed by calling [phone] within 21 calendar days, refer to reference ID # [id number].     Thank you

## 2022-01-21 ENCOUNTER — HOSPITAL ENCOUNTER (OUTPATIENT)
Dept: SLEEP CENTER | Age: 32
Discharge: HOME | End: 2022-01-23
Payer: COMMERCIAL

## 2022-01-21 DIAGNOSIS — G47.33 MODERATE OBSTRUCTIVE SLEEP APNEA: ICD-10-CM

## 2022-01-21 DIAGNOSIS — R06.83 SNORING: ICD-10-CM

## 2022-01-21 DIAGNOSIS — G47.30 OBSERVED SLEEP APNEA: ICD-10-CM

## 2022-01-21 DIAGNOSIS — Z72.820 SLEEP DEPRIVATION: ICD-10-CM

## 2022-01-21 DIAGNOSIS — G47.10 HYPERSOMNIA: ICD-10-CM

## 2022-01-21 DIAGNOSIS — R53.83 FATIGUE, UNSPECIFIED TYPE: ICD-10-CM

## 2022-01-21 PROCEDURE — 95806 SLEEP STUDY UNATT&RESP EFFT: CPT

## 2022-01-25 PROCEDURE — 95806 SLEEP STUDY UNATT&RESP EFFT: CPT | Performed by: INTERNAL MEDICINE

## 2022-01-26 ENCOUNTER — TELEPHONE (OUTPATIENT)
Dept: PULMONOLOGY | Age: 32
End: 2022-01-26

## 2022-01-26 DIAGNOSIS — G47.33 OSA (OBSTRUCTIVE SLEEP APNEA): Primary | ICD-10-CM

## 2022-02-10 ENCOUNTER — TELEPHONE (OUTPATIENT)
Dept: PULMONOLOGY | Age: 32
End: 2022-02-10

## 2022-02-10 NOTE — TELEPHONE ENCOUNTER
Denied  Received: Today  PARAS Mata MD  Cc: [facility]; Blanche Corbin  Caller: Unspecified (Today,  6:50 AM)  [facility] has denied an in-lab titration study due to not being medically necessary. If you do not agree with this determination a peer to peer can be completed by calling [phone] within 21 calendar days, refer to reference ID # [id number].       VV 9/14/21:    Assessment:       · Hypersomnia and Fatigue  · Snoring  · Observed sleep apnea   · Sleep deprivation   · Moderate DAYNA used CPAP in 2014  · Obesity class  · Essential hypertension         Plan:       · Obtain old records for prior PSG/PAP titration. · CPAP titration if able to fine old PSG, otherwise split night  · Nasal mask/Pillow   · Discussed with patient the pathophysiology of apnea. · Sleep hygiene- advised to target 7-9 hrs of sleep  · Avoid sedatives, alcohol and caffeinated drinks at bed time. · No driving motorized vehicles or operating heavy machinery while fatigue, drowsy or sleepy. · Weight loss is also recommended as a long-term intervention. · Continue blood pressure medications - treatment of DAYNA can lower blood pressure by levels that are clinically significant.    · Complications of DAYNA if not treated were discussed with patient patient to include systemic hypertension, pulmonary hypertension, cardiovascular morbidities, car accidents and all cause mortality.

## 2022-02-28 NOTE — TELEPHONE ENCOUNTER
Spoke with patient whom states he is currently fighting with insurance states that insurance is stating that our office is out of network. Patient states he will call the office if he needs anything further, but at this point he is stuck.

## 2022-03-11 ENCOUNTER — OFFICE VISIT (OUTPATIENT)
Dept: FAMILY MEDICINE CLINIC | Age: 32
End: 2022-03-11
Payer: COMMERCIAL

## 2022-03-11 VITALS
HEIGHT: 70 IN | TEMPERATURE: 97.1 F | HEART RATE: 101 BPM | BODY MASS INDEX: 42.66 KG/M2 | DIASTOLIC BLOOD PRESSURE: 84 MMHG | WEIGHT: 298 LBS | RESPIRATION RATE: 16 BRPM | OXYGEN SATURATION: 97 % | SYSTOLIC BLOOD PRESSURE: 132 MMHG

## 2022-03-11 DIAGNOSIS — Z02.89 ENCOUNTER FOR PHYSICAL EXAMINATION RELATED TO EMPLOYMENT: ICD-10-CM

## 2022-03-11 DIAGNOSIS — Z02.89 ENCOUNTER FOR PHYSICAL EXAMINATION RELATED TO EMPLOYMENT: Primary | ICD-10-CM

## 2022-03-11 DIAGNOSIS — E66.01 CLASS 3 SEVERE OBESITY DUE TO EXCESS CALORIES WITH SERIOUS COMORBIDITY AND BODY MASS INDEX (BMI) OF 40.0 TO 44.9 IN ADULT (HCC): ICD-10-CM

## 2022-03-11 DIAGNOSIS — I10 ESSENTIAL HYPERTENSION: ICD-10-CM

## 2022-03-11 PROCEDURE — 86580 TB INTRADERMAL TEST: CPT | Performed by: NURSE PRACTITIONER

## 2022-03-11 PROCEDURE — G8484 FLU IMMUNIZE NO ADMIN: HCPCS | Performed by: NURSE PRACTITIONER

## 2022-03-11 PROCEDURE — 99395 PREV VISIT EST AGE 18-39: CPT | Performed by: NURSE PRACTITIONER

## 2022-03-11 ASSESSMENT — PATIENT HEALTH QUESTIONNAIRE - PHQ9
SUM OF ALL RESPONSES TO PHQ QUESTIONS 1-9: 0
2. FEELING DOWN, DEPRESSED OR HOPELESS: 0
DEPRESSION UNABLE TO ASSESS: FUNCTIONAL CAPACITY MOTIVATION LIMITS ACCURACY
SUM OF ALL RESPONSES TO PHQ QUESTIONS 1-9: 0
1. LITTLE INTEREST OR PLEASURE IN DOING THINGS: 0
SUM OF ALL RESPONSES TO PHQ9 QUESTIONS 1 & 2: 0

## 2022-03-11 ASSESSMENT — ENCOUNTER SYMPTOMS
DIARRHEA: 0
COUGH: 0
SHORTNESS OF BREATH: 0
VOMITING: 0
NAUSEA: 0

## 2022-03-11 NOTE — PROGRESS NOTES
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 male, here for a preventive medicine evaluation. 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  Denies complaints  HTN controlled    Patient Active Problem List   Diagnosis    Essential hypertension    Class 3 severe obesity due to excess calories with serious comorbidity and body mass index (BMI) of 40.0 to 44.9 in adult [facility])    Hepatic steatosis    Aortic root dilatation (HCC)    Enlarged heart    Pneumonia of both lungs due to infectious organism    DAYNA (obstructive sleep apnea)    Hypersomnia    Fatigue    Snoring    Observed sleep apnea    Sleep deprivation    Moderate obstructive sleep apnea       Review of Systems   Constitutional: Negative for fatigue and fever. Respiratory: Negative for cough and shortness of breath. Cardiovascular: Negative for chest pain and leg swelling. Gastrointestinal: Negative for diarrhea, nausea and vomiting. Musculoskeletal: Negative for arthralgias. Neurological: Negative for dizziness and headaches. Prior to Visit Medications    Medication Sig Taking?  Authorizing Provider   lisinopril-hydroCHLOROthiazide (PRINZIDE;ZESTORETIC) 20-12.5 MG per tablet Take 2 tablets by mouth once daily Yes KELLI Parham CNP   amLODIPine (NORVASC) 5 MG tablet Take 1 tablet by mouth once daily Yes KELLI Parham CNP        No Known Allergies    Past Medical History:   Diagnosis Date    Hypertension        Past Surgical History:   Procedure Laterality Date    KNEE SURGERY      SHOULDER SURGERY         Social History     Socioeconomic History    Marital status: Single     Spouse name: Not on file    Number of children: Not on file    Years of education: Not on file    Highest education level: Not on file   Occupational History    Not on file   Tobacco Use    Smoking status: Never Smoker    Smokeless tobacco: Former User     Types: Chew   Vaping Use    Vaping Use: Never used   Substance and Sexual Activity    Alcohol use: Never    Drug use: Never    Sexual activity: Yes     Partners: Female   Other Topics Concern    Not on file   Social History Narrative    Not on file     Social Determinants of Health     Financial Resource Strain: Low Risk     Difficulty of Paying Living Expenses: Not hard at all   Food Insecurity: No Food Insecurity    Worried About Running Out of Food in the Last Year: Never true    [place] of Food in the Last Year: Never true   Transportation Needs: No Transportation Needs    Lack of Transportation (Medical): No    Lack of Transportation (Non-Medical):  No   Physical Activity:     Days of Exercise per Week: Not on file    Minutes of Exercise per Session: Not on file   Stress:     Feeling of Stress : Not on file   Social Connections:     Frequency of Communication with Friends and Family: Not on file    Frequency of Social Gatherings with Friends and Family: Not on file    Attends Hinduism Services: Not on file    Active Member of [address] [place] [address] or Organizations: Not on file    Attends Club or Organization Meetings: Not on file    Marital Status: Not on file   Intimate Partner Violence:     Fear of Current or Ex-Partner: Not on file    Emotionally Abused: Not on file    Physically Abused: Not on file    Sexually Abused: Not on file   Housing Stability:     Unable to Pay for Housing in the Last Year: Not on file    Number of [place] in the Last Year: Not on file    Unstable Housing in the Last Year: Not on file        Family History   Problem Relation Age of Onset    High Blood Pressure Mother     Heart Surgery Mother         CABG    Diabetes Mother     Heart Attack Mother     Diabetes Sister     Diabetes Brother     Heart Surgery Maternal Grandfather     Diabetes Maternal Grandfather     Heart Attack Maternal Grandfather     Hypertension Brother     Diabetes Brother        ADVANCE DIRECTIVE: N, <no information>    Vitals:    03/11/22 1424 03/11/22 1428   BP: (!) 134/94 132/84   Site: Left Upper Arm Left Upper Arm   Position: Sitting Sitting   Pulse: 101    Resp: 16    Temp: 97.1 °F (36.2 °C)    TempSrc: Temporal    SpO2: 97%    Weight: 298 lb (135.2 kg)    Height: 5' 10\" (1.778 m)      Estimated body mass index is 42.76 kg/m² as calculated from the following:    Height as of this encounter: 5' 10\" (1.778 m). Weight as of this encounter: 298 lb (135.2 kg). Physical Exam  Vitals and nursing note reviewed. Constitutional:       General: He is not in acute distress. Appearance: Normal appearance. He is well-developed. He is obese. He is not ill-appearing, toxic-appearing or diaphoretic. HENT:      Head: Normocephalic and atraumatic. Eyes:      Extraocular Movements: Extraocular movements intact. Conjunctiva/sclera: Conjunctivae normal.      Pupils: Pupils are equal, round, and reactive to light. Cardiovascular:      Rate and Rhythm: Normal rate and regular rhythm. Heart sounds: Normal heart sounds. No murmur heard. No friction rub. No gallop. Pulmonary:      Effort: Pulmonary effort is normal. No respiratory distress. Breath sounds: Normal breath sounds. No stridor. No wheezing, rhonchi or rales. Neurological:      General: No focal deficit present. Mental Status: He is alert and oriented to person, place, and time. Mental status is at baseline. Cranial Nerves: No cranial nerve deficit. No flowsheet data found. Lab Results   Component Value Date    CHOL 192 03/17/2021    TRIG 493 03/17/2021    HDL 28 03/17/2021    LDLCALC see below 03/17/2021    GLUCOSE 90 12/03/2021    LABA1C 5.2 03/17/2021       The ASCVD Risk score (Aldair Bourgeois., et al., 2013) failed to calculate for the following reasons:     The 2013 ASCVD risk score is only valid for ages 36 to 78    Immunization History   Administered Date(s) Administered    COVID-19, Pfizer Purple top, DILUTE for use, 12+ yrs, 30mcg/0.3mL dose 08/14/2021, 09/04/2021    Influenza Virus Vaccine 09/04/2021    PPD Test 03/11/2022    Tdap (Boostrix, Adacel) 09/04/2021       Health Maintenance   Topic Date Due    Hepatitis C screen  Never done    Varicella vaccine (1 of 2 - 2-dose childhood series) Never done    HIV screen  Never done    COVID-19 Vaccine (3 - Booster for Garcia Peter series) 02/04/2022    Potassium monitoring  12/03/2022    Creatinine monitoring  12/03/2022    Depression Screen  12/10/2022    DTaP/Tdap/Td vaccine (2 - Td or Tdap) 09/04/2031    Flu vaccine  Completed    Hepatitis A vaccine  Aged Out    Hepatitis B vaccine  Aged Out    Hib vaccine  Aged Out    Meningococcal (ACWY) vaccine  Aged Out    Pneumococcal 0-64 years Vaccine  Aged Out       Assessment & Plan   Encounter for physical examination related to employment  -     Hepatitis B Surface Antibody; Future  -     Mumps Antibody, IgG; Future  -     Rubella antibody, IgG; Future  -     Rubeola Antibody, IgG; Future  -     Varicella Zoster Antibody, IgG; Future  Essential hypertension - controlled  Class 3 severe obesity due to excess calories with serious comorbidity and body mass index (BMI) of 40.0 to 44.9 in adult [facility])    Return in about 6 months (around 9/11/2022) for Hypertension.          --Sarahi Power, APRN - CNP

## 2022-03-12 LAB
HBV SURFACE AB TITR SER: <3.5 MIU/ML
MEASLES IMMUNE (IGG): NORMAL
MUMPS AB IGG: NORMAL
RUBELLA ANTIBODY IGG: 8.8 IU/ML
VARICELLA-ZOSTER VIRUS AB, IGG: NORMAL

## 2022-03-14 ENCOUNTER — NURSE ONLY (OUTPATIENT)
Dept: FAMILY MEDICINE CLINIC | Age: 32
End: 2022-03-14

## 2022-03-14 ENCOUNTER — TELEPHONE (OUTPATIENT)
Dept: FAMILY MEDICINE CLINIC | Age: 32
End: 2022-03-14

## 2022-03-14 DIAGNOSIS — E88.9 ERROR, INBORN METABOLISM: Primary | ICD-10-CM

## 2022-03-14 LAB
INDURATION: NORMAL
TB SKIN TEST: NEGATIVE

## 2022-03-18 ENCOUNTER — NURSE ONLY (OUTPATIENT)
Dept: FAMILY MEDICINE CLINIC | Age: 32
End: 2022-03-18
Payer: COMMERCIAL

## 2022-03-18 PROCEDURE — 90471 IMMUNIZATION ADMIN: CPT | Performed by: NURSE PRACTITIONER

## 2022-03-18 PROCEDURE — 90746 HEPB VACCINE 3 DOSE ADULT IM: CPT | Performed by: NURSE PRACTITIONER

## 2022-03-18 PROCEDURE — 86580 TB INTRADERMAL TEST: CPT | Performed by: NURSE PRACTITIONER

## 2022-03-18 PROCEDURE — 90472 IMMUNIZATION ADMIN EACH ADD: CPT | Performed by: NURSE PRACTITIONER

## 2022-03-18 PROCEDURE — 90707 MMR VACCINE SC: CPT | Performed by: NURSE PRACTITIONER

## 2022-03-21 ENCOUNTER — NURSE ONLY (OUTPATIENT)
Dept: FAMILY MEDICINE CLINIC | Age: 32
End: 2022-03-21

## 2022-03-21 LAB
INDURATION: NORMAL
TB SKIN TEST: NORMAL

## 2022-03-30 ENCOUNTER — OFFICE VISIT (OUTPATIENT)
Dept: FAMILY MEDICINE CLINIC | Age: 32
End: 2022-03-30
Payer: COMMERCIAL

## 2022-03-30 VITALS
SYSTOLIC BLOOD PRESSURE: 134 MMHG | TEMPERATURE: 97.1 F | WEIGHT: 297 LBS | RESPIRATION RATE: 16 BRPM | BODY MASS INDEX: 42.62 KG/M2 | OXYGEN SATURATION: 96 % | HEART RATE: 103 BPM | DIASTOLIC BLOOD PRESSURE: 80 MMHG

## 2022-03-30 DIAGNOSIS — G47.33 OSA (OBSTRUCTIVE SLEEP APNEA): ICD-10-CM

## 2022-03-30 DIAGNOSIS — I10 PRIMARY HYPERTENSION: Primary | ICD-10-CM

## 2022-03-30 DIAGNOSIS — E66.01 CLASS 3 SEVERE OBESITY DUE TO EXCESS CALORIES WITH SERIOUS COMORBIDITY AND BODY MASS INDEX (BMI) OF 40.0 TO 44.9 IN ADULT (HCC): ICD-10-CM

## 2022-03-30 PROBLEM — J18.9 PNEUMONIA OF BOTH LUNGS DUE TO INFECTIOUS ORGANISM: Status: RESOLVED | Noted: 2021-07-06 | Resolved: 2022-03-30

## 2022-03-30 PROCEDURE — G8417 CALC BMI ABV UP PARAM F/U: HCPCS | Performed by: NURSE PRACTITIONER

## 2022-03-30 PROCEDURE — G8427 DOCREV CUR MEDS BY ELIG CLIN: HCPCS | Performed by: NURSE PRACTITIONER

## 2022-03-30 PROCEDURE — 1036F TOBACCO NON-USER: CPT | Performed by: NURSE PRACTITIONER

## 2022-03-30 PROCEDURE — 99214 OFFICE O/P EST MOD 30 MIN: CPT | Performed by: NURSE PRACTITIONER

## 2022-03-30 PROCEDURE — G8484 FLU IMMUNIZE NO ADMIN: HCPCS | Performed by: NURSE PRACTITIONER

## 2022-03-30 SDOH — ECONOMIC STABILITY: HOUSING INSECURITY
IN THE LAST 12 MONTHS, WAS THERE A TIME WHEN YOU DID NOT HAVE A STEADY PLACE TO SLEEP OR SLEPT IN A SHELTER (INCLUDING NOW)?: NO

## 2022-03-30 SDOH — ECONOMIC STABILITY: FOOD INSECURITY: WITHIN THE PAST 12 MONTHS, YOU WORRIED THAT YOUR FOOD WOULD RUN OUT BEFORE YOU GOT MONEY TO BUY MORE.: NEVER TRUE

## 2022-03-30 SDOH — ECONOMIC STABILITY: INCOME INSECURITY: IN THE LAST 12 MONTHS, WAS THERE A TIME WHEN YOU WERE NOT ABLE TO PAY THE MORTGAGE OR RENT ON TIME?: NO

## 2022-03-30 SDOH — ECONOMIC STABILITY: FOOD INSECURITY: WITHIN THE PAST 12 MONTHS, THE FOOD YOU BOUGHT JUST DIDN'T LAST AND YOU DIDN'T HAVE MONEY TO GET MORE.: NEVER TRUE

## 2022-03-30 ASSESSMENT — PATIENT HEALTH QUESTIONNAIRE - PHQ9
1. LITTLE INTEREST OR PLEASURE IN DOING THINGS: 0
SUM OF ALL RESPONSES TO PHQ QUESTIONS 1-9: 0
SUM OF ALL RESPONSES TO PHQ QUESTIONS 1-9: 0
DEPRESSION UNABLE TO ASSESS: FUNCTIONAL CAPACITY MOTIVATION LIMITS ACCURACY
SUM OF ALL RESPONSES TO PHQ9 QUESTIONS 1 & 2: 0
2. FEELING DOWN, DEPRESSED OR HOPELESS: 0
SUM OF ALL RESPONSES TO PHQ QUESTIONS 1-9: 0
SUM OF ALL RESPONSES TO PHQ QUESTIONS 1-9: 0

## 2022-03-30 ASSESSMENT — ENCOUNTER SYMPTOMS
NAUSEA: 0
DIARRHEA: 0
SHORTNESS OF BREATH: 0
VOMITING: 0
COUGH: 0

## 2022-03-30 ASSESSMENT — SOCIAL DETERMINANTS OF HEALTH (SDOH): HOW HARD IS IT FOR YOU TO PAY FOR THE VERY BASICS LIKE FOOD, HOUSING, MEDICAL CARE, AND HEATING?: NOT HARD AT ALL

## 2022-03-30 NOTE — PROGRESS NOTES
3/30/2022     Chief Complaint   Patient presents with    Hypertension     Missy Hooper (:  1990) is a 27 y.o. male, here for evaluation of the following medical concerns:    HPI    Hypertension: Here for follow-up on high blood pressure. He was put on a steroid taper for right shoulder pain, 1 day on that blood pressure increased to 215/1 tens. He stopped the steroid and his blood pressure the following day return to control. He does check his blood pressure at home daily and reports that that is most consistently controlled less than 140/90. He is taking his blood pressure medications as prescribed 100% of the time. He denies chest pain, leg swelling. He is hopeful to be approved for weight loss surgery with his new medical insurance. He was diagnosed with obstructive sleep apnea but has not been able to get the CPAP machine because his insurance is not paying for it. He will be getting new insurance in the next few weeks and plans to try getting his machine and again at that time. He realizes that his weight is a big contributor to his hypertension and sleep apnea. Review of Systems   Constitutional: Negative for fatigue and fever. Respiratory: Negative for cough and shortness of breath. Cardiovascular: Negative for chest pain and leg swelling. Gastrointestinal: Negative for diarrhea, nausea and vomiting. Neurological: Negative for dizziness and headaches. Prior to Visit Medications    Medication Sig Taking?  Authorizing Provider   lisinopril-hydroCHLOROthiazide (PRINZIDE;ZESTORETIC) 20-12.5 MG per tablet Take 2 tablets by mouth once daily Yes KELLI Reyes CNP   amLODIPine (NORVASC) 5 MG tablet Take 1 tablet by mouth once daily Yes KELLI Reyes CNP        Social History     Tobacco Use    Smoking status: Never Smoker    Smokeless tobacco: Former User     Types: Chew   Substance Use Topics    Alcohol use: Never        Vitals:    22 0857   BP: 134/80   Site: Left Upper Arm   Position: Sitting   Pulse: 103   Resp: 16   Temp: 97.1 °F (36.2 °C)   TempSrc: Temporal   SpO2: 96%   Weight: 297 lb (134.7 kg)     Estimated body mass index is 42.62 kg/m² as calculated from the following:    Height as of 3/11/22: 5' 10\" (1.778 m). Weight as of this encounter: 297 lb (134.7 kg). Physical Exam  Vitals and nursing note reviewed. Constitutional:       General: He is not in acute distress. Appearance: Normal appearance. He is well-developed. He is obese. He is not ill-appearing, toxic-appearing or diaphoretic. HENT:      Head: Normocephalic and atraumatic. Eyes:      Conjunctiva/sclera: Conjunctivae normal.      Pupils: Pupils are equal, round, and reactive to light. Cardiovascular:      Rate and Rhythm: Normal rate and regular rhythm. Heart sounds: Normal heart sounds. No murmur heard. No friction rub. No gallop. Pulmonary:      Effort: Pulmonary effort is normal. No respiratory distress. Breath sounds: Normal breath sounds. No stridor. No wheezing, rhonchi or rales. Neurological:      General: No focal deficit present. Mental Status: He is alert and oriented to person, place, and time. Mental status is at baseline. Cranial Nerves: No cranial nerve deficit. ASSESSMENT/PLAN:  1. Primary hypertension-his blood pressure is controlled today we will continue current medications. Asked him to check his blood pressure daily for the next 2 weeks and then to send a [employer] message with all those readings. 2. DAYNA (obstructive sleep apnea)-awaiting CPAP supplies    3. Class 3 severe obesity due to excess calories with serious comorbidity and body mass index (BMI) of 40.0 to 44.9 in adult (HCC)-discussed, considering weight loss surgery after he gets his new medical insurance      Return in about 2 weeks (around 4/13/2022) for Hypertension. An electronic signature was used to authenticate this note.     --[facility] KELLI Sharma - CNP on 3/30/2022 at 9:24 AM

## 2022-03-30 NOTE — PATIENT INSTRUCTIONS
· Check bp daily and send those numbers in 2 weeks  · Continue current meds for now  · Goal BP <140/90

## 2022-04-05 ENCOUNTER — HOSPITAL ENCOUNTER (OUTPATIENT)
Dept: CARDIOLOGY | Age: 32
Discharge: HOME | End: 2022-04-05
Payer: COMMERCIAL

## 2022-04-05 ENCOUNTER — TELEPHONE (OUTPATIENT)
Dept: PULMONOLOGY | Age: 32
End: 2022-04-05

## 2022-04-05 DIAGNOSIS — I51.7 ENLARGED HEART: ICD-10-CM

## 2022-04-05 DIAGNOSIS — G47.33 MODERATE OBSTRUCTIVE SLEEP APNEA: Primary | ICD-10-CM

## 2022-04-05 DIAGNOSIS — I77.810 AORTIC ROOT DILATATION (HCC): ICD-10-CM

## 2022-04-05 LAB
LV EF: 55 %
LVEF MODALITY: NORMAL

## 2022-04-05 PROCEDURE — 93306 TTE W/DOPPLER COMPLETE: CPT

## 2022-04-05 NOTE — TELEPHONE ENCOUNTER
New auto pap orders pending to [facility].  Patient scheduled for 31-90 6/28/22
28-Jun-2020 22:32

## 2022-07-13 ENCOUNTER — TELEPHONE (OUTPATIENT)
Dept: PULMONOLOGY | Age: 32
End: 2022-07-13

## 2022-07-13 NOTE — TELEPHONE ENCOUNTER
Patient did not show [id number] day setup , 4/14 with i breeze  appointment  with Dr. Seema Willett on 7/13/22    Same Day Cancellation: No    Patient rescheduled:  No    New appointment:     Patient was also no show on: na    LOV    Assessment: 09/14/21      · Hypersomnia and Fatigue  · Snoring  · Observed sleep apnea   · Sleep deprivation   · Moderate DAYNA used CPAP in 2014  · Obesity class  · Essential hypertension         Plan:       · Obtain old records for prior PSG/PAP titration. · CPAP titration if able to fine old PSG, otherwise split night  · Nasal mask/Pillow   · Discussed with patient the pathophysiology of apnea. · Sleep hygiene- advised to target 7-9 hrs of sleep  · Avoid sedatives, alcohol and caffeinated drinks at bed time. · No driving motorized vehicles or operating heavy machinery while fatigue, drowsy or sleepy. · Weight loss is also recommended as a long-term intervention. · Continue blood pressure medications - treatment of DAYNA can lower blood pressure by levels that are clinically significant. · Complications of DAYNA if not treated were discussed with patient patient to include systemic hypertension, pulmonary hypertension, cardiovascular morbidities, car accidents and all cause mortality.

## 2022-07-15 NOTE — TELEPHONE ENCOUNTER
Spoke with patient whom states he forgot about appt. Pt was set up 4/14. Pt is over his compliance period. LM with with Reshma to check what next steps would be for pt.

## 2022-08-10 DIAGNOSIS — I10 ESSENTIAL HYPERTENSION: ICD-10-CM

## 2022-08-10 RX ORDER — AMLODIPINE BESYLATE 5 MG/1
TABLET ORAL
Qty: 30 TABLET | Refills: 0 | Status: SHIPPED | OUTPATIENT
Start: 2022-08-10 | End: 2022-10-25 | Stop reason: SDUPTHER

## 2022-10-25 ENCOUNTER — OFFICE VISIT (OUTPATIENT)
Dept: FAMILY MEDICINE CLINIC | Age: 32
End: 2022-10-25
Payer: COMMERCIAL

## 2022-10-25 VITALS
BODY MASS INDEX: 41.9 KG/M2 | HEART RATE: 82 BPM | RESPIRATION RATE: 16 BRPM | WEIGHT: 292 LBS | TEMPERATURE: 97.8 F | OXYGEN SATURATION: 98 % | DIASTOLIC BLOOD PRESSURE: 90 MMHG | SYSTOLIC BLOOD PRESSURE: 138 MMHG

## 2022-10-25 DIAGNOSIS — F43.9 STRESS: ICD-10-CM

## 2022-10-25 DIAGNOSIS — Z13.220 SCREENING CHOLESTEROL LEVEL: ICD-10-CM

## 2022-10-25 DIAGNOSIS — I10 ESSENTIAL HYPERTENSION: Primary | ICD-10-CM

## 2022-10-25 DIAGNOSIS — I10 ESSENTIAL HYPERTENSION: ICD-10-CM

## 2022-10-25 DIAGNOSIS — E66.01 CLASS 3 SEVERE OBESITY DUE TO EXCESS CALORIES WITH SERIOUS COMORBIDITY AND BODY MASS INDEX (BMI) OF 40.0 TO 44.9 IN ADULT (HCC): ICD-10-CM

## 2022-10-25 LAB
A/G RATIO: 1.9 (ref 1.1–2.2)
ALBUMIN SERPL-MCNC: 4.9 G/DL (ref 3.4–5)
ALP BLD-CCNC: 73 U/L (ref 40–129)
ALT SERPL-CCNC: 35 U/L (ref 10–40)
ANION GAP SERPL CALCULATED.3IONS-SCNC: 15 MMOL/L (ref 3–16)
AST SERPL-CCNC: 22 U/L (ref 15–37)
BASOPHILS ABSOLUTE: 0.1 K/UL (ref 0–0.2)
BASOPHILS RELATIVE PERCENT: 0.7 %
BILIRUB SERPL-MCNC: 0.4 MG/DL (ref 0–1)
BUN BLDV-MCNC: 13 MG/DL (ref 7–20)
CALCIUM SERPL-MCNC: 10 MG/DL (ref 8.3–10.6)
CHLORIDE BLD-SCNC: 103 MMOL/L (ref 99–110)
CHOLESTEROL, FASTING: 179 MG/DL (ref 0–199)
CO2: 24 MMOL/L (ref 21–32)
CREAT SERPL-MCNC: 0.9 MG/DL (ref 0.9–1.3)
EOSINOPHILS ABSOLUTE: 0.1 K/UL (ref 0–0.6)
EOSINOPHILS RELATIVE PERCENT: 1.8 %
GFR SERPL CREATININE-BSD FRML MDRD: >60 ML/MIN/{1.73_M2}
GLUCOSE BLD-MCNC: 93 MG/DL (ref 70–99)
HCT VFR BLD CALC: 45.7 % (ref 40.5–52.5)
HDLC SERPL-MCNC: 26 MG/DL (ref 40–60)
HEMOGLOBIN: 16 G/DL (ref 13.5–17.5)
LDL CHOLESTEROL CALCULATED: ABNORMAL MG/DL
LDL CHOLESTEROL DIRECT: 75 MG/DL
LYMPHOCYTES ABSOLUTE: 2.7 K/UL (ref 1–5.1)
LYMPHOCYTES RELATIVE PERCENT: 34.2 %
MCH RBC QN AUTO: 28.8 PG (ref 26–34)
MCHC RBC AUTO-ENTMCNC: 34.9 G/DL (ref 31–36)
MCV RBC AUTO: 82.5 FL (ref 80–100)
MONOCYTES ABSOLUTE: 0.6 K/UL (ref 0–1.3)
MONOCYTES RELATIVE PERCENT: 7.5 %
NEUTROPHILS ABSOLUTE: 4.4 K/UL (ref 1.7–7.7)
NEUTROPHILS RELATIVE PERCENT: 55.8 %
PDW BLD-RTO: 13.5 % (ref 12.4–15.4)
PLATELET # BLD: 245 K/UL (ref 135–450)
PMV BLD AUTO: 8.8 FL (ref 5–10.5)
POTASSIUM SERPL-SCNC: 5.2 MMOL/L (ref 3.5–5.1)
RBC # BLD: 5.54 M/UL (ref 4.2–5.9)
SODIUM BLD-SCNC: 142 MMOL/L (ref 136–145)
TOTAL PROTEIN: 7.5 G/DL (ref 6.4–8.2)
TRIGLYCERIDE, FASTING: 523 MG/DL (ref 0–150)
TSH REFLEX: 1.04 UIU/ML (ref 0.27–4.2)
VLDLC SERPL CALC-MCNC: ABNORMAL MG/DL
WBC # BLD: 7.9 K/UL (ref 4–11)

## 2022-10-25 PROCEDURE — 90746 HEPB VACCINE 3 DOSE ADULT IM: CPT | Performed by: NURSE PRACTITIONER

## 2022-10-25 PROCEDURE — 99214 OFFICE O/P EST MOD 30 MIN: CPT | Performed by: NURSE PRACTITIONER

## 2022-10-25 PROCEDURE — 90472 IMMUNIZATION ADMIN EACH ADD: CPT | Performed by: NURSE PRACTITIONER

## 2022-10-25 PROCEDURE — 90471 IMMUNIZATION ADMIN: CPT | Performed by: NURSE PRACTITIONER

## 2022-10-25 PROCEDURE — 90734 MENACWYD/MENACWYCRM VACC IM: CPT | Performed by: NURSE PRACTITIONER

## 2022-10-25 RX ORDER — AMLODIPINE BESYLATE 10 MG/1
TABLET ORAL
Qty: 90 TABLET | Refills: 1 | Status: SHIPPED | OUTPATIENT
Start: 2022-10-25

## 2022-10-25 RX ORDER — LISINOPRIL AND HYDROCHLOROTHIAZIDE 20; 12.5 MG/1; MG/1
TABLET ORAL
Qty: 180 TABLET | Refills: 1 | Status: SHIPPED | OUTPATIENT
Start: 2022-10-25

## 2022-10-25 ASSESSMENT — ANXIETY QUESTIONNAIRES
IF YOU CHECKED OFF ANY PROBLEMS ON THIS QUESTIONNAIRE, HOW DIFFICULT HAVE THESE PROBLEMS MADE IT FOR YOU TO DO YOUR WORK, TAKE CARE OF THINGS AT HOME, OR GET ALONG WITH OTHER PEOPLE: NOT DIFFICULT AT ALL
3. WORRYING TOO MUCH ABOUT DIFFERENT THINGS: 0
1. FEELING NERVOUS, ANXIOUS, OR ON EDGE: 1
7. FEELING AFRAID AS IF SOMETHING AWFUL MIGHT HAPPEN: 0
2. NOT BEING ABLE TO STOP OR CONTROL WORRYING: 1
4. TROUBLE RELAXING: 0
GAD7 TOTAL SCORE: 2
6. BECOMING EASILY ANNOYED OR IRRITABLE: 0
5. BEING SO RESTLESS THAT IT IS HARD TO SIT STILL: 0

## 2022-10-25 ASSESSMENT — PATIENT HEALTH QUESTIONNAIRE - PHQ9
SUM OF ALL RESPONSES TO PHQ QUESTIONS 1-9: 0
1. LITTLE INTEREST OR PLEASURE IN DOING THINGS: 0
SUM OF ALL RESPONSES TO PHQ QUESTIONS 1-9: 0
DEPRESSION UNABLE TO ASSESS: FUNCTIONAL CAPACITY MOTIVATION LIMITS ACCURACY
SUM OF ALL RESPONSES TO PHQ QUESTIONS 1-9: 0
SUM OF ALL RESPONSES TO PHQ9 QUESTIONS 1 & 2: 0
SUM OF ALL RESPONSES TO PHQ QUESTIONS 1-9: 0
2. FEELING DOWN, DEPRESSED OR HOPELESS: 0

## 2022-10-25 ASSESSMENT — ENCOUNTER SYMPTOMS
NAUSEA: 0
SHORTNESS OF BREATH: 0
COUGH: 0
VOMITING: 0
DIARRHEA: 0

## 2022-10-25 NOTE — PROGRESS NOTES
chest pain and leg swelling. Gastrointestinal:  Negative for diarrhea, nausea and vomiting. Neurological:  Negative for dizziness and headaches. Prior to Visit Medications    Medication Sig Taking? Authorizing Provider   amLODIPine (NORVASC) 10 MG tablet Take 1 tablet by mouth once daily Yes KELLI Merlos CNP   lisinopril-hydroCHLOROthiazide (PRINZIDE;ZESTORETIC) 20-12.5 MG per tablet Take 2 tablets by mouth once daily Yes KELLI Merlos CNP        Social History     Tobacco Use    Smoking status: Never    Smokeless tobacco: Former     Types: Chew     Quit date: 6/1/2021   Substance Use Topics    Alcohol use: Never        Vitals:    10/25/22 1021 10/25/22 1025   BP: (!) 142/92 (!) 138/90   Site: Left Upper Arm Left Upper Arm   Position: Sitting Sitting   Pulse: 82    Resp: 16    Temp: 97.8 °F (36.6 °C)    TempSrc: Temporal    SpO2: 98%    Weight: 292 lb (132.5 kg)      Estimated body mass index is 41.9 kg/m² as calculated from the following:    Height as of 3/11/22: 5' 10\" (1.778 m). Weight as of this encounter: 292 lb (132.5 kg). Physical Exam  Vitals and nursing note reviewed. Constitutional:       General: He is not in acute distress. Appearance: Normal appearance. He is well-developed. He is obese. He is not ill-appearing, toxic-appearing or diaphoretic. HENT:      Head: Normocephalic and atraumatic. Eyes:      Extraocular Movements: Extraocular movements intact. Conjunctiva/sclera: Conjunctivae normal.      Pupils: Pupils are equal, round, and reactive to light. Cardiovascular:      Rate and Rhythm: Normal rate and regular rhythm. Heart sounds: Normal heart sounds. No murmur heard. No friction rub. No gallop. Pulmonary:      Effort: Pulmonary effort is normal. No respiratory distress. Breath sounds: Normal breath sounds. No stridor. No wheezing, rhonchi or rales. Neurological:      General: No focal deficit present.       Mental Status: He is alert and oriented to person, place, and time. Mental status is at baseline. Cranial Nerves: No cranial nerve deficit. ASSESSMENT/PLAN:  1. Essential hypertension- borderline, will increase amlodipine to 10 mg daily. - Comprehensive Metabolic Panel; Future  - CBC with Auto Differential; Future  - Lipid, Fasting; Future  - TSH with Reflex; Future  - amLODIPine (NORVASC) 10 MG tablet; Take 1 tablet by mouth once daily  Dispense: 90 tablet; Refill: 1    2. Screening cholesterol level  - Lipid, Fasting; Future    3. Class 3 severe obesity due to excess calories with serious comorbidity and body mass index (BMI) of 40.0 to 44.9 in [facility])- weight management     4. Stress- would like to talk to counselor, given referral information    Return in about 3 months (around 1/25/2023). An electronic signature was used to authenticate this note.     --KELLI Sotelo - CNP on 10/25/2022 at 12:00 PM

## 2022-10-25 NOTE — PATIENT INSTRUCTIONS
Labs today  Increase amlodipine 10 mg daily  Second and final dose of MMR today  Second dose of Hep B today, will need another dose in 6 months  Follow up in January         [facility]  [phone] [address], [address] [place] [address] [address] (Afshan Vera, [address])  [address], [address]  [phone]    [facility]  Address: [address] Warner  Valeriano Engle  Phone: [phone]

## 2023-01-04 ENCOUNTER — TELEMEDICINE (OUTPATIENT)
Dept: FAMILY MEDICINE CLINIC | Age: 33
End: 2023-01-04
Payer: COMMERCIAL

## 2023-01-04 DIAGNOSIS — J20.5 RSV BRONCHITIS: Primary | ICD-10-CM

## 2023-01-04 DIAGNOSIS — J01.40 ACUTE PANSINUSITIS, RECURRENCE NOT SPECIFIED: ICD-10-CM

## 2023-01-04 PROCEDURE — 99213 OFFICE O/P EST LOW 20 MIN: CPT | Performed by: NURSE PRACTITIONER

## 2023-01-04 RX ORDER — METHYLPREDNISOLONE 4 MG/1
TABLET ORAL
Qty: 1 KIT | Refills: 0 | Status: SHIPPED | OUTPATIENT
Start: 2023-01-04

## 2023-01-04 RX ORDER — DEXTROMETHORPHAN HYDROBROMIDE AND PROMETHAZINE HYDROCHLORIDE 15; 6.25 MG/5ML; MG/5ML
5 SYRUP ORAL 4 TIMES DAILY PRN
Qty: 240 ML | Refills: 0 | Status: SHIPPED | OUTPATIENT
Start: 2023-01-04

## 2023-01-04 ASSESSMENT — ENCOUNTER SYMPTOMS
CHEST TIGHTNESS: 0
GASTROINTESTINAL NEGATIVE: 1
SHORTNESS OF BREATH: 0
EYES NEGATIVE: 1
COUGH: 1
TROUBLE SWALLOWING: 0
SORE THROAT: 0
WHEEZING: 0
SINUS PRESSURE: 1

## 2023-01-04 ASSESSMENT — PATIENT HEALTH QUESTIONNAIRE - PHQ9
SUM OF ALL RESPONSES TO PHQ9 QUESTIONS 1 & 2: 0
SUM OF ALL RESPONSES TO PHQ QUESTIONS 1-9: 0
1. LITTLE INTEREST OR PLEASURE IN DOING THINGS: 0
2. FEELING DOWN, DEPRESSED OR HOPELESS: 0
SUM OF ALL RESPONSES TO PHQ QUESTIONS 1-9: 0

## 2023-01-04 NOTE — PROGRESS NOTES
2023    TELEHEALTH EVALUATION -- Audio/Visual (During NNL-61 public health emergency)    HPI:    Rebekah Stewart (:  1990) has requested an audio/video evaluation for the following concern(s):    Chief Complaint   Patient presents with    Cough    Congestion       Nayeli Galaviz is seen today for follow up from urgent care. He had been traveling over the [place] weekend. Then his son tested positive for both RSV and flu. His symptoms started just this past  with sinus pressure, postnasal drip and cough. 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 He was seen at urgent care yesterday and was recommended supportive care. He is concerned because the symptoms are not improving. He denies any fevers or chills. no pharyngitis or otalgia. When he does blow that is clear. His biggest concern is the sinus pressure and the postnasal drip. Review of Systems   Constitutional:  Positive for fatigue. Negative for activity change, chills and fever. HENT:  Positive for congestion, postnasal drip and sinus pressure. Negative for ear discharge, ear pain, sore throat and trouble swallowing. Eyes: Negative. Respiratory:  Positive for cough (Dry). Negative for chest tightness, shortness of breath and wheezing. Cardiovascular: Negative. Gastrointestinal: Negative. Genitourinary: Negative. Musculoskeletal: Negative. Allergic/Immunologic: Negative for environmental allergies. Neurological: Negative. Prior to Visit Medications    Medication Sig Taking? Authorizing Provider   promethazine-dextromethorphan (PROMETHAZINE-DM) 6.25-15 MG/5ML syrup Take 5 mLs by mouth 4 times daily as needed for Cough Yes Alon Comfort, APRN - CNP   methylPREDNISolone (MEDROL DOSEPACK) 4 MG tablet Take by mouth.  Yes Alon Comfort, APRN - CNP   amLODIPine (NORVASC) 10 MG tablet Take 1 tablet by mouth once daily Yes Farzad Berger APRN - CNP lisinopril-hydroCHLOROthiazide (PRINZIDE;ZESTORETIC) 20-12.5 MG per tablet Take 2 tablets by mouth once daily Yes KELLI Kessler - CNP       Social History     Tobacco Use    Smoking status: Never    Smokeless tobacco: Former     Types: Chew     Quit date: 6/1/2021   Vaping Use    Vaping Use: Never used   Substance Use Topics    Alcohol use: Never    Drug use: Never        No Known Allergies,   Past Medical History:   Diagnosis Date    Hypertension     Pneumonia of both lungs due to infectious organism 7/6/2021   ,   Past Surgical History:   Procedure Laterality Date    KNEE SURGERY      SHOULDER SURGERY         PHYSICAL EXAMINATION:  Patient-Reported Vitals 9/14/2021   Patient-Reported Weight 265 lbs   Patient-Reported Height 5'10   Patient-Reported Systolic -   Patient-Reported Diastolic -         Physical Exam  Constitutional:       General: He is not in acute distress. Appearance: Normal appearance. He is obese. He is not ill-appearing. HENT:      Head: Normocephalic and atraumatic. Comments: Very audibly congested     Right Ear: External ear normal.      Left Ear: External ear normal.      Nose: Nose normal. No rhinorrhea. Mouth/Throat:      Pharynx: Oropharynx is clear. Eyes:      General:         Right eye: No discharge. Left eye: No discharge. Conjunctiva/sclera: Conjunctivae normal.   Neck:      Trachea: Phonation normal.   Pulmonary:      Effort: Pulmonary effort is normal. No respiratory distress. Comments: Respirations easy and even, able to speak in complete sentences without shortness of breath or audible wheezing   Musculoskeletal:      Cervical back: Normal range of motion. Skin:     Coloration: Skin is not jaundiced or pale. Neurological:      General: No focal deficit present. Mental Status: He is alert and oriented to person, place, and time.    Psychiatric:         Mood and Affect: Mood normal.         Behavior: Behavior normal. Thought Content: Thought content normal.         Judgment: Judgment normal.         ASSESSMENT/PLAN:    ICD-10-CM    1. RSV bronchitis  J20.5       2. Acute pansinusitis, recurrence not specified  J01.40         Previous COVID and flu testing negative, positive RSV close exposure  Recommended continued supportive care due to symptoms being less than 72 hours  We will do Medrol taper to help with sinus pressure/congestion  Promethazine DM  Continue Flonase nasal spray recommend plain Mucinex  Report if not feeling better in the next 72 hours    Orders Placed This Encounter   Medications    promethazine-dextromethorphan (PROMETHAZINE-DM) 6.25-15 MG/5ML syrup     Sig: Take 5 mLs by mouth 4 times daily as needed for Cough     Dispense:  240 mL     Refill:  0    methylPREDNISolone (MEDROL DOSEPACK) 4 MG tablet     Sig: Take by mouth. Dispense:  1 kit     Refill:  0     No orders of the defined types were placed in this encounter. Return if symptoms worsen or fail to improve. Lashaun Camara is a 26 y.o. male  was evaluated through a synchronous (real-time) audio-video encounter. The patient (or guardian if applicable) is aware that this is a billable service, which includes applicable co-pays. This Virtual Visit was conducted with patient's (and/or legal guardian's) consent. The visit was conducted pursuant to the emergency declaration under the [address] [place] [address], [address] [place] [address] authority and the [employer] Act. Patient identification was verified, and a caregiver was present when appropriate. The patient was located in a state where the provider was licensed to provide care. Patient identification was verified at the start of the visit: Yes    Total time spent on this encounter: Not billed by time    Services were provided through a video synchronous discussion virtually to substitute for in-person clinic visit.  Patient and provider were located at their individual homes. --KELLI Lin - CNP on 1/4/2023 at 8:41 AM    An electronic signature was used to authenticate this note.

## 2023-01-12 ENCOUNTER — OFFICE VISIT (OUTPATIENT)
Dept: FAMILY MEDICINE CLINIC | Age: 33
End: 2023-01-12
Payer: COMMERCIAL

## 2023-01-12 VITALS
BODY MASS INDEX: 42.33 KG/M2 | TEMPERATURE: 97.1 F | SYSTOLIC BLOOD PRESSURE: 143 MMHG | WEIGHT: 295 LBS | RESPIRATION RATE: 16 BRPM | HEART RATE: 84 BPM | OXYGEN SATURATION: 99 % | DIASTOLIC BLOOD PRESSURE: 89 MMHG

## 2023-01-12 DIAGNOSIS — I10 ESSENTIAL HYPERTENSION: ICD-10-CM

## 2023-01-12 DIAGNOSIS — H65.02 NON-RECURRENT ACUTE SEROUS OTITIS MEDIA OF LEFT EAR: Primary | ICD-10-CM

## 2023-01-12 DIAGNOSIS — G47.33 OSA (OBSTRUCTIVE SLEEP APNEA): ICD-10-CM

## 2023-01-12 DIAGNOSIS — E66.01 CLASS 3 SEVERE OBESITY DUE TO EXCESS CALORIES WITH SERIOUS COMORBIDITY AND BODY MASS INDEX (BMI) OF 40.0 TO 44.9 IN ADULT (HCC): ICD-10-CM

## 2023-01-12 PROCEDURE — 3079F DIAST BP 80-89 MM HG: CPT | Performed by: NURSE PRACTITIONER

## 2023-01-12 PROCEDURE — 99214 OFFICE O/P EST MOD 30 MIN: CPT | Performed by: NURSE PRACTITIONER

## 2023-01-12 PROCEDURE — 3077F SYST BP >= 140 MM HG: CPT | Performed by: NURSE PRACTITIONER

## 2023-01-12 RX ORDER — METOPROLOL SUCCINATE 25 MG/1
25 TABLET, EXTENDED RELEASE ORAL DAILY
Qty: 90 TABLET | Refills: 0 | Status: SHIPPED | OUTPATIENT
Start: 2023-01-12

## 2023-01-12 ASSESSMENT — PATIENT HEALTH QUESTIONNAIRE - PHQ9
2. FEELING DOWN, DEPRESSED OR HOPELESS: 0
SUM OF ALL RESPONSES TO PHQ9 QUESTIONS 1 & 2: 0
SUM OF ALL RESPONSES TO PHQ QUESTIONS 1-9: 0
DEPRESSION UNABLE TO ASSESS: FUNCTIONAL CAPACITY MOTIVATION LIMITS ACCURACY
SUM OF ALL RESPONSES TO PHQ QUESTIONS 1-9: 0
1. LITTLE INTEREST OR PLEASURE IN DOING THINGS: 0
SUM OF ALL RESPONSES TO PHQ QUESTIONS 1-9: 0
SUM OF ALL RESPONSES TO PHQ QUESTIONS 1-9: 0

## 2023-01-12 ASSESSMENT — ANXIETY QUESTIONNAIRES
1. FEELING NERVOUS, ANXIOUS, OR ON EDGE: 0
4. TROUBLE RELAXING: 0
6. BECOMING EASILY ANNOYED OR IRRITABLE: 0
3. WORRYING TOO MUCH ABOUT DIFFERENT THINGS: 0
GAD7 TOTAL SCORE: 0
IF YOU CHECKED OFF ANY PROBLEMS ON THIS QUESTIONNAIRE, HOW DIFFICULT HAVE THESE PROBLEMS MADE IT FOR YOU TO DO YOUR WORK, TAKE CARE OF THINGS AT HOME, OR GET ALONG WITH OTHER PEOPLE: NOT DIFFICULT AT ALL
7. FEELING AFRAID AS IF SOMETHING AWFUL MIGHT HAPPEN: 0
2. NOT BEING ABLE TO STOP OR CONTROL WORRYING: 0
5. BEING SO RESTLESS THAT IT IS HARD TO SIT STILL: 0

## 2023-01-12 ASSESSMENT — ENCOUNTER SYMPTOMS
DIARRHEA: 0
COUGH: 0
ABDOMINAL PAIN: 0

## 2023-01-12 NOTE — PROGRESS NOTES
2023     Chief Complaint   Patient presents with    Ear Fullness     Feels Muffled  andis hard to hear out of left ear        Mary Interiano (:  1990) is a 26 y.o. male, here for evaluation of the following medical concerns:    Left ear fullness and muffled hearing since he was sick with RSV 2 weeks ago. No pain or drainage or fever. URI symptoms resolved     Ear Fullness   There is pain in the left ear. This is a new problem. The current episode started 1 to 4 weeks ago. The problem occurs constantly. The problem has been unchanged. There has been no fever. The fever has been present for Less than 1 day. The patient is experiencing no pain. Pertinent negatives include no abdominal pain, coughing, diarrhea, headaches, hearing loss or neck pain. He has tried nothing for the symptoms. The treatment provided no relief. Hypertension:  Home blood pressure monitoring: No.  He is adherent to a low sodium diet. Patient denies chest pain and shortness of breath. Antihypertensive medication side effects: no medication side effects noted. Use of agents associated with hypertension: none. Sodium (mmol/L)   Date Value   10/25/2022 142    BUN (mg/dL)   Date Value   10/25/2022 13    Glucose (mg/dL)   Date Value   10/25/2022 93      Potassium (mmol/L)   Date Value   10/25/2022 5.2 (H)    Creatinine (mg/dL)   Date Value   10/25/2022 0.9           Review of Systems   HENT:  Negative for hearing loss. Ear fullness, left   Respiratory:  Negative for cough. DAYNA- not using CPAP, woke up one night with cord wrapped around his neck and will not use it anymore   Gastrointestinal:  Negative for abdominal pain and diarrhea. Musculoskeletal:  Negative for neck pain. Neurological:  Negative for headaches. Prior to Visit Medications    Medication Sig Taking?  Authorizing Provider   metoprolol succinate (TOPROL XL) 25 MG extended release tablet Take 1 tablet by mouth daily Yes Oscar Ates, APRN - CNP   amLODIPine (NORVASC) 10 MG tablet Take 1 tablet by mouth once daily Yes Oscar Ates, APRN - CNP   lisinopril-hydroCHLOROthiazide (PRINZIDE;ZESTORETIC) 20-12.5 MG per tablet Take 2 tablets by mouth once daily Yes Oscar Sosa, APRN - CNP        Social History     Tobacco Use    Smoking status: Never    Smokeless tobacco: Former     Types: Chew     Quit date: 6/1/2021   Substance Use Topics    Alcohol use: Never        Vitals:    01/12/23 0700 01/12/23 0702   BP: (!) 162/102 (!) 143/89   Site: Left Upper Arm Left Upper Arm   Position: Sitting Sitting   Pulse: 84    Resp: 16    Temp: 97.1 °F (36.2 °C)    TempSrc: Temporal    SpO2: 99%    Weight: 295 lb (133.8 kg)      Estimated body mass index is 42.33 kg/m² as calculated from the following:    Height as of 3/11/22: 5' 10\" (1.778 m). Weight as of this encounter: 295 lb (133.8 kg). Physical Exam  Vitals and nursing note reviewed. Constitutional:       General: He is not in acute distress. Appearance: Normal appearance. He is well-developed. He is obese. He is not ill-appearing, toxic-appearing or diaphoretic. HENT:      Head: Normocephalic and atraumatic. Eyes:      Conjunctiva/sclera: Conjunctivae normal.      Pupils: Pupils are equal, round, and reactive to light. Cardiovascular:      Rate and Rhythm: Normal rate and regular rhythm. Heart sounds: Normal heart sounds. No murmur heard. No friction rub. No gallop. Pulmonary:      Effort: Pulmonary effort is normal. No respiratory distress. Breath sounds: Normal breath sounds. No stridor. No wheezing, rhonchi or rales. Neurological:      General: No focal deficit present. Mental Status: He is alert and oriented to person, place, and time. Mental status is at baseline. Cranial Nerves: No cranial nerve deficit.        ASSESSMENT/PLAN:  1. Non-recurrent acute serous otitis media of left ear- start Flonase two sprays up each side of the nose daily, start Zyrtec daily x 2 weeks. Call with update if no improvement    2. Essential hypertension- uncontrolled, continue amlodipine, lisinopril-HCTZ. Start Toprol XL 25 mg daily   - metoprolol succinate (TOPROL XL) 25 MG extended release tablet; Take 1 tablet by mouth daily  Dispense: 90 tablet; Refill: 0    3. DAYNA (obstructive sleep apnea)- not using CPAP, discussed untreated sleep apnea is contributing to hypertension    4. Class 3 severe obesity due to excess calories with serious comorbidity and body mass index (BMI) of 40.0 to 44.9 in adult [facility])- needs weight management, insurance does not cover it. Consider GLP-1    Return in about 4 weeks (around 2/9/2023). An electronic signature was used to authenticate this note.     --Celia Brice, KELLI - CNP on 1/12/2023 at 8:12 AM

## 2023-01-12 NOTE — PATIENT INSTRUCTIONS
- start Zyrtec daily one tablet  - Start Flonase (two sprays up each side of the nose once daily)  - call next week if the ear is not improved  - BP uncontrolled, add on metoprolol   - continue amlodipine and lisinopril-hctz     2/9/23 at 720

## 2023-02-09 ENCOUNTER — OFFICE VISIT (OUTPATIENT)
Dept: FAMILY MEDICINE CLINIC | Age: 33
End: 2023-02-09
Payer: COMMERCIAL

## 2023-02-09 VITALS
HEART RATE: 78 BPM | OXYGEN SATURATION: 98 % | TEMPERATURE: 97.1 F | WEIGHT: 292 LBS | BODY MASS INDEX: 41.9 KG/M2 | RESPIRATION RATE: 16 BRPM | DIASTOLIC BLOOD PRESSURE: 89 MMHG | SYSTOLIC BLOOD PRESSURE: 136 MMHG

## 2023-02-09 DIAGNOSIS — I10 ESSENTIAL HYPERTENSION: Primary | ICD-10-CM

## 2023-02-09 DIAGNOSIS — E66.01 CLASS 3 SEVERE OBESITY DUE TO EXCESS CALORIES WITH SERIOUS COMORBIDITY AND BODY MASS INDEX (BMI) OF 40.0 TO 44.9 IN ADULT (HCC): ICD-10-CM

## 2023-02-09 PROCEDURE — 99213 OFFICE O/P EST LOW 20 MIN: CPT | Performed by: NURSE PRACTITIONER

## 2023-02-09 PROCEDURE — 3079F DIAST BP 80-89 MM HG: CPT | Performed by: NURSE PRACTITIONER

## 2023-02-09 PROCEDURE — 3075F SYST BP GE 130 - 139MM HG: CPT | Performed by: NURSE PRACTITIONER

## 2023-02-09 SDOH — ECONOMIC STABILITY: INCOME INSECURITY: HOW HARD IS IT FOR YOU TO PAY FOR THE VERY BASICS LIKE FOOD, HOUSING, MEDICAL CARE, AND HEATING?: NOT HARD AT ALL

## 2023-02-09 SDOH — ECONOMIC STABILITY: FOOD INSECURITY: WITHIN THE PAST 12 MONTHS, THE FOOD YOU BOUGHT JUST DIDN'T LAST AND YOU DIDN'T HAVE MONEY TO GET MORE.: NEVER TRUE

## 2023-02-09 SDOH — ECONOMIC STABILITY: FOOD INSECURITY: WITHIN THE PAST 12 MONTHS, YOU WORRIED THAT YOUR FOOD WOULD RUN OUT BEFORE YOU GOT MONEY TO BUY MORE.: NEVER TRUE

## 2023-02-09 ASSESSMENT — ENCOUNTER SYMPTOMS
ABDOMINAL PAIN: 0
DIARRHEA: 0
STRIDOR: 0
NAUSEA: 0
COUGH: 0
VOMITING: 0

## 2023-02-09 ASSESSMENT — PATIENT HEALTH QUESTIONNAIRE - PHQ9
DEPRESSION UNABLE TO ASSESS: FUNCTIONAL CAPACITY MOTIVATION LIMITS ACCURACY
SUM OF ALL RESPONSES TO PHQ QUESTIONS 1-9: 0
SUM OF ALL RESPONSES TO PHQ QUESTIONS 1-9: 0
SUM OF ALL RESPONSES TO PHQ9 QUESTIONS 1 & 2: 0
SUM OF ALL RESPONSES TO PHQ QUESTIONS 1-9: 0
1. LITTLE INTEREST OR PLEASURE IN DOING THINGS: 0
SUM OF ALL RESPONSES TO PHQ QUESTIONS 1-9: 0
2. FEELING DOWN, DEPRESSED OR HOPELESS: 0

## 2023-02-09 ASSESSMENT — ANXIETY QUESTIONNAIRES
7. FEELING AFRAID AS IF SOMETHING AWFUL MIGHT HAPPEN: 0
4. TROUBLE RELAXING: 0
3. WORRYING TOO MUCH ABOUT DIFFERENT THINGS: 0
1. FEELING NERVOUS, ANXIOUS, OR ON EDGE: 0
5. BEING SO RESTLESS THAT IT IS HARD TO SIT STILL: 0
IF YOU CHECKED OFF ANY PROBLEMS ON THIS QUESTIONNAIRE, HOW DIFFICULT HAVE THESE PROBLEMS MADE IT FOR YOU TO DO YOUR WORK, TAKE CARE OF THINGS AT HOME, OR GET ALONG WITH OTHER PEOPLE: NOT DIFFICULT AT ALL
2. NOT BEING ABLE TO STOP OR CONTROL WORRYING: 0
6. BECOMING EASILY ANNOYED OR IRRITABLE: 0
GAD7 TOTAL SCORE: 0

## 2023-02-09 NOTE — LETTER
Rosa Singletary [address] [place], [place] [address] [address]  Phone: [phone]  Fax: [phone]    KELLI Pardo CNP        February 9, 2023     Patient: Daisy Camara   YOB: 1990   Date of Visit: 2/9/2023       To Whom it May Concern:    Sonali Abernathy had PPD that was placed on 03/18/2022 and was read as negative on 03/21/2022. If you have any questions or concerns, please don't hesitate to call.     Sincerely,         KELLI Pardo CNP

## 2023-02-09 NOTE — PATIENT INSTRUCTIONS
- continue current medication  - continue home BP monitoring  - Follow up in 4 months    June 7th at 7 am
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
## 2023-04-29 DIAGNOSIS — I10 ESSENTIAL HYPERTENSION: ICD-10-CM

## 2023-05-01 RX ORDER — AMLODIPINE BESYLATE 10 MG/1
TABLET ORAL
Qty: 90 TABLET | Refills: 1 | OUTPATIENT
Start: 2023-05-01

## 2023-05-01 RX ORDER — METOPROLOL SUCCINATE 25 MG/1
25 TABLET, EXTENDED RELEASE ORAL DAILY
Qty: 90 TABLET | Refills: 0 | Status: SHIPPED | OUTPATIENT
Start: 2023-05-01

## 2023-05-01 RX ORDER — AMLODIPINE BESYLATE 10 MG/1
TABLET ORAL
Qty: 90 TABLET | Refills: 0 | Status: SHIPPED | OUTPATIENT
Start: 2023-05-01

## 2023-05-01 RX ORDER — LISINOPRIL AND HYDROCHLOROTHIAZIDE 20; 12.5 MG/1; MG/1
TABLET ORAL
Qty: 180 TABLET | Refills: 0 | Status: SHIPPED | OUTPATIENT
Start: 2023-05-01

## 2023-05-01 RX ORDER — LISINOPRIL AND HYDROCHLOROTHIAZIDE 20; 12.5 MG/1; MG/1
TABLET ORAL
Qty: 180 TABLET | Refills: 1 | OUTPATIENT
Start: 2023-05-01

## 2023-05-01 NOTE — TELEPHONE ENCOUNTER
2/9/2023    Future Appointments   Date Time Provider Sara Scott   6/7/2023  7:00 AM Neris Sidles, APRN - CNP [facility] [place] [facility]

## 2023-05-01 NOTE — TELEPHONE ENCOUNTER
2/9/2023    Future Appointments   Date Time Provider Sara Scott   6/7/2023  7:00 AM KELLI Sanchez CNP

## 2023-06-02 ENCOUNTER — PATIENT MESSAGE (OUTPATIENT)
Dept: FAMILY MEDICINE CLINIC | Age: 33
End: 2023-06-02

## 2023-06-02 ENCOUNTER — OFFICE VISIT (OUTPATIENT)
Dept: FAMILY MEDICINE CLINIC | Age: 33
End: 2023-06-02
Payer: COMMERCIAL

## 2023-06-02 VITALS
TEMPERATURE: 97.1 F | OXYGEN SATURATION: 96 % | WEIGHT: 300 LBS | HEART RATE: 79 BPM | BODY MASS INDEX: 43.05 KG/M2 | RESPIRATION RATE: 16 BRPM | SYSTOLIC BLOOD PRESSURE: 129 MMHG | DIASTOLIC BLOOD PRESSURE: 83 MMHG

## 2023-06-02 DIAGNOSIS — B35.1 NAIL FUNGUS: ICD-10-CM

## 2023-06-02 DIAGNOSIS — E66.01 CLASS 3 SEVERE OBESITY DUE TO EXCESS CALORIES WITH SERIOUS COMORBIDITY AND BODY MASS INDEX (BMI) OF 40.0 TO 44.9 IN ADULT (HCC): ICD-10-CM

## 2023-06-02 DIAGNOSIS — I10 ESSENTIAL HYPERTENSION: ICD-10-CM

## 2023-06-02 DIAGNOSIS — Z13.1 DIABETES MELLITUS SCREENING: ICD-10-CM

## 2023-06-02 DIAGNOSIS — I10 ESSENTIAL HYPERTENSION: Primary | ICD-10-CM

## 2023-06-02 DIAGNOSIS — E66.01 CLASS 3 SEVERE OBESITY DUE TO EXCESS CALORIES WITH SERIOUS COMORBIDITY AND BODY MASS INDEX (BMI) OF 40.0 TO 44.9 IN ADULT (HCC): Primary | ICD-10-CM

## 2023-06-02 LAB
ANION GAP SERPL CALCULATED.3IONS-SCNC: 12 MMOL/L (ref 3–16)
BUN SERPL-MCNC: 18 MG/DL (ref 7–20)
CALCIUM SERPL-MCNC: 9.4 MG/DL (ref 8.3–10.6)
CHLORIDE SERPL-SCNC: 97 MMOL/L (ref 99–110)
CO2 SERPL-SCNC: 23 MMOL/L (ref 21–32)
CREAT SERPL-MCNC: 0.8 MG/DL (ref 0.9–1.3)
GFR SERPLBLD CREATININE-BSD FMLA CKD-EPI: >60 ML/MIN/{1.73_M2}
GLUCOSE SERPL-MCNC: 107 MG/DL (ref 70–99)
POTASSIUM SERPL-SCNC: 4.5 MMOL/L (ref 3.5–5.1)
SODIUM SERPL-SCNC: 132 MMOL/L (ref 136–145)

## 2023-06-02 PROCEDURE — 3079F DIAST BP 80-89 MM HG: CPT | Performed by: NURSE PRACTITIONER

## 2023-06-02 PROCEDURE — 3074F SYST BP LT 130 MM HG: CPT | Performed by: NURSE PRACTITIONER

## 2023-06-02 PROCEDURE — 99214 OFFICE O/P EST MOD 30 MIN: CPT | Performed by: NURSE PRACTITIONER

## 2023-06-02 SDOH — ECONOMIC STABILITY: FOOD INSECURITY: WITHIN THE PAST 12 MONTHS, THE FOOD YOU BOUGHT JUST DIDN'T LAST AND YOU DIDN'T HAVE MONEY TO GET MORE.: NEVER TRUE

## 2023-06-02 SDOH — ECONOMIC STABILITY: FOOD INSECURITY: WITHIN THE PAST 12 MONTHS, YOU WORRIED THAT YOUR FOOD WOULD RUN OUT BEFORE YOU GOT MONEY TO BUY MORE.: NEVER TRUE

## 2023-06-02 SDOH — ECONOMIC STABILITY: INCOME INSECURITY: HOW HARD IS IT FOR YOU TO PAY FOR THE VERY BASICS LIKE FOOD, HOUSING, MEDICAL CARE, AND HEATING?: NOT HARD AT ALL

## 2023-06-02 ASSESSMENT — PATIENT HEALTH QUESTIONNAIRE - PHQ9
SUM OF ALL RESPONSES TO PHQ QUESTIONS 1-9: 0
DEPRESSION UNABLE TO ASSESS: FUNCTIONAL CAPACITY MOTIVATION LIMITS ACCURACY
SUM OF ALL RESPONSES TO PHQ9 QUESTIONS 1 & 2: 0
SUM OF ALL RESPONSES TO PHQ QUESTIONS 1-9: 0
1. LITTLE INTEREST OR PLEASURE IN DOING THINGS: 0
SUM OF ALL RESPONSES TO PHQ QUESTIONS 1-9: 0
SUM OF ALL RESPONSES TO PHQ QUESTIONS 1-9: 0
2. FEELING DOWN, DEPRESSED OR HOPELESS: 0

## 2023-06-02 ASSESSMENT — ANXIETY QUESTIONNAIRES
7. FEELING AFRAID AS IF SOMETHING AWFUL MIGHT HAPPEN: 0
IF YOU CHECKED OFF ANY PROBLEMS ON THIS QUESTIONNAIRE, HOW DIFFICULT HAVE THESE PROBLEMS MADE IT FOR YOU TO DO YOUR WORK, TAKE CARE OF THINGS AT HOME, OR GET ALONG WITH OTHER PEOPLE: NOT DIFFICULT AT ALL
6. BECOMING EASILY ANNOYED OR IRRITABLE: 0
GAD7 TOTAL SCORE: 0
3. WORRYING TOO MUCH ABOUT DIFFERENT THINGS: 0
1. FEELING NERVOUS, ANXIOUS, OR ON EDGE: 0
5. BEING SO RESTLESS THAT IT IS HARD TO SIT STILL: 0
4. TROUBLE RELAXING: 0
2. NOT BEING ABLE TO STOP OR CONTROL WORRYING: 0

## 2023-06-02 ASSESSMENT — ENCOUNTER SYMPTOMS
STRIDOR: 0
WHEEZING: 0
DIARRHEA: 0
COUGH: 0
NAUSEA: 0
VOMITING: 0
SHORTNESS OF BREATH: 0

## 2023-06-02 NOTE — PROGRESS NOTES
in about 6 months (around 12/2/2023). An electronic signature was used to authenticate this note.     --Alis Luz, KELLI - CNP on 6/2/2023 at 8:37 AM

## 2023-06-02 NOTE — TELEPHONE ENCOUNTER
From: Gerri Granger  To: Lidia Cruz  Sent: 6/2/2023 8:46 AM EDT  Subject: Weight loss     Sara Dillon pre our talk during my appointment. I got home and talked to my wife. She says go a head and set me up with a weight loss place to talk about having the sleeve surgery. If thats what u feel would be the best course of action then she said lets set it up.  Thanks

## 2023-06-02 NOTE — PATIENT INSTRUCTIONS
Continue medications  Blood work today  Nail Laquer nightly to the big toe nail     Friday December 1st at 7 am

## 2023-06-03 LAB
EST. AVERAGE GLUCOSE BLD GHB EST-MCNC: 96.8 MG/DL
HBA1C MFR BLD: 5 %

## 2023-06-23 DIAGNOSIS — B35.1 NAIL FUNGUS: ICD-10-CM

## 2023-06-23 NOTE — TELEPHONE ENCOUNTER
6/2/2023    LAST FILLED:   6/2/23 6ML WITH 0 REFILLS      Future Appointments   Date Time Provider Sara Scott   12/1/2023  7:00 AM KELLI Zee - CNP [facility] [place] [facility]

## 2023-06-24 ENCOUNTER — TELEPHONE (OUTPATIENT)
Dept: BARIATRICS/WEIGHT MGMT | Age: 33
End: 2023-06-24

## 2023-06-24 NOTE — TELEPHONE ENCOUNTER
Patient was sent Dr. Claudine Agarwal digital bariatric seminar. Patient has [facility] insurance. Unfortunately they are out of network with Dr. Florin Hope and [facility].     Pt notified via e-mail; should he choose to continue with scheduling it would as SELF PAY. Self pay information provided.

## 2023-08-29 DIAGNOSIS — B35.1 NAIL FUNGUS: ICD-10-CM

## 2023-08-29 DIAGNOSIS — I10 ESSENTIAL HYPERTENSION: ICD-10-CM

## 2023-08-29 RX ORDER — AMLODIPINE BESYLATE 10 MG/1
10 TABLET ORAL DAILY
Qty: 90 TABLET | Refills: 3 | Status: SHIPPED | OUTPATIENT
Start: 2023-08-29

## 2023-08-29 RX ORDER — METOPROLOL SUCCINATE 25 MG/1
25 TABLET, EXTENDED RELEASE ORAL DAILY
Qty: 90 TABLET | Refills: 3 | Status: SHIPPED | OUTPATIENT
Start: 2023-08-29

## 2023-08-29 RX ORDER — LISINOPRIL AND HYDROCHLOROTHIAZIDE 20; 12.5 MG/1; MG/1
2 TABLET ORAL DAILY
Qty: 180 TABLET | Refills: 3 | Status: SHIPPED | OUTPATIENT
Start: 2023-08-29

## 2023-08-29 NOTE — TELEPHONE ENCOUNTER
Future Appointments   Date Time Provider [address] [place] [address]   12/1/2023  7:00 AM KELLI Mayo - CNP [facility] [place] [facility]     LOV 6/2/2023

## 2023-09-21 ENCOUNTER — OFFICE VISIT (OUTPATIENT)
Dept: FAMILY MEDICINE CLINIC | Age: 33
End: 2023-09-21
Payer: COMMERCIAL

## 2023-09-21 VITALS
RESPIRATION RATE: 16 BRPM | HEART RATE: 94 BPM | OXYGEN SATURATION: 94 % | SYSTOLIC BLOOD PRESSURE: 129 MMHG | TEMPERATURE: 97.1 F | WEIGHT: 298 LBS | DIASTOLIC BLOOD PRESSURE: 68 MMHG | BODY MASS INDEX: 42.76 KG/M2

## 2023-09-21 DIAGNOSIS — I10 PRIMARY HYPERTENSION: Primary | ICD-10-CM

## 2023-09-21 DIAGNOSIS — E66.01 CLASS 3 SEVERE OBESITY DUE TO EXCESS CALORIES WITH SERIOUS COMORBIDITY AND BODY MASS INDEX (BMI) OF 40.0 TO 44.9 IN ADULT (HCC): ICD-10-CM

## 2023-09-21 DIAGNOSIS — Z01.818 PREOPERATIVE CLEARANCE: ICD-10-CM

## 2023-09-21 DIAGNOSIS — F17.200 NICOTINE DEPENDENCE WITH CURRENT USE: ICD-10-CM

## 2023-09-21 DIAGNOSIS — G47.33 OSA (OBSTRUCTIVE SLEEP APNEA): ICD-10-CM

## 2023-09-21 PROCEDURE — 99214 OFFICE O/P EST MOD 30 MIN: CPT | Performed by: NURSE PRACTITIONER

## 2023-09-21 PROCEDURE — 3074F SYST BP LT 130 MM HG: CPT | Performed by: NURSE PRACTITIONER

## 2023-09-21 PROCEDURE — 3078F DIAST BP <80 MM HG: CPT | Performed by: NURSE PRACTITIONER

## 2023-09-21 RX ORDER — NICOTINE 21 MG/24HR
1 PATCH, TRANSDERMAL 24 HOURS TRANSDERMAL DAILY
Qty: 42 PATCH | Refills: 0 | Status: SHIPPED | OUTPATIENT
Start: 2023-09-21 | End: 2023-11-02

## 2023-09-21 SDOH — ECONOMIC STABILITY: INCOME INSECURITY: HOW HARD IS IT FOR YOU TO PAY FOR THE VERY BASICS LIKE FOOD, HOUSING, MEDICAL CARE, AND HEATING?: NOT HARD AT ALL

## 2023-09-21 SDOH — ECONOMIC STABILITY: FOOD INSECURITY: WITHIN THE PAST 12 MONTHS, YOU WORRIED THAT YOUR FOOD WOULD RUN OUT BEFORE YOU GOT MONEY TO BUY MORE.: NEVER TRUE

## 2023-09-21 SDOH — ECONOMIC STABILITY: FOOD INSECURITY: WITHIN THE PAST 12 MONTHS, THE FOOD YOU BOUGHT JUST DIDN'T LAST AND YOU DIDN'T HAVE MONEY TO GET MORE.: NEVER TRUE

## 2023-09-21 ASSESSMENT — ANXIETY QUESTIONNAIRES
6. BECOMING EASILY ANNOYED OR IRRITABLE: 0
IF YOU CHECKED OFF ANY PROBLEMS ON THIS QUESTIONNAIRE, HOW DIFFICULT HAVE THESE PROBLEMS MADE IT FOR YOU TO DO YOUR WORK, TAKE CARE OF THINGS AT HOME, OR GET ALONG WITH OTHER PEOPLE: NOT DIFFICULT AT ALL
5. BEING SO RESTLESS THAT IT IS HARD TO SIT STILL: 0
3. WORRYING TOO MUCH ABOUT DIFFERENT THINGS: 0
7. FEELING AFRAID AS IF SOMETHING AWFUL MIGHT HAPPEN: 0
1. FEELING NERVOUS, ANXIOUS, OR ON EDGE: 0
2. NOT BEING ABLE TO STOP OR CONTROL WORRYING: 0
4. TROUBLE RELAXING: 0
GAD7 TOTAL SCORE: 0

## 2023-09-21 ASSESSMENT — PATIENT HEALTH QUESTIONNAIRE - PHQ9
SUM OF ALL RESPONSES TO PHQ QUESTIONS 1-9: 0
1. LITTLE INTEREST OR PLEASURE IN DOING THINGS: 0
DEPRESSION UNABLE TO ASSESS: FUNCTIONAL CAPACITY MOTIVATION LIMITS ACCURACY
SUM OF ALL RESPONSES TO PHQ QUESTIONS 1-9: 0
2. FEELING DOWN, DEPRESSED OR HOPELESS: 0
SUM OF ALL RESPONSES TO PHQ9 QUESTIONS 1 & 2: 0

## 2023-09-21 ASSESSMENT — ENCOUNTER SYMPTOMS
VOMITING: 0
COUGH: 0
WHEEZING: 0
DIARRHEA: 0
STRIDOR: 0
SHORTNESS OF BREATH: 0
NAUSEA: 0

## 2023-09-21 NOTE — PROGRESS NOTES
2023     Chief Complaint   Patient presents with    Bariatric     Discuss [address] and needs information or other options        Woody Mcclelland (:  1990) is a 26 y.o. male, here for evaluation of the following medical concerns:    HPI    Hypertension:  Home blood pressure monitoring: Yes - controlled. He is not adherent to a low sodium diet. He is more consistent with taking his medication. His blood pressure is well controlled. Denies CP or SOB. Has been working with a  with diet and exercise for the last 2 months. He has not lost any weight. He is exercising regularly and more frequently. BP is initially elevated but did improve on recheck     He is working with [address] [place] [address] bariatric group and is planning to have sleeve gastrectomy in December or November. He is scheduled for EGD 10/3/2023. He has 60 days to become nicotine free. He uses chewing tobacco, states he uses 2 and half cans of dip a day. He has used this since he was 13years old. He is worried he is can have difficulty stopping the nicotine. Review of Systems   Constitutional:  Negative for fatigue and fever. Respiratory:  Negative for cough, shortness of breath, wheezing and stridor. Cardiovascular:  Negative for chest pain and leg swelling. Gastrointestinal:  Negative for diarrhea, nausea and vomiting. Neurological:  Negative for dizziness and headaches. Prior to Visit Medications    Medication Sig Taking?  Authorizing Provider   nicotine (NICODERM CQ) 21 MG/24HR Place 1 patch onto the skin daily Yes Slim Hudson, KELLI - CNP   amLODIPine (NORVASC) 10 MG tablet Take 1 tablet by mouth daily Yes Slim Hudson, APRN - SRIKANTH   lisinopril-hydroCHLOROthiazide (PRINZIDE;ZESTORETIC) 20-12.5 MG per tablet Take 2 tablets by mouth daily Yes Slim Hudson, APRN - CNP   metoprolol succinate (TOPROL XL) 25 MG extended release tablet Take 1 tablet by mouth daily Yes Slim Hudson, KELLI -

## 2023-10-16 ENCOUNTER — OFFICE VISIT (OUTPATIENT)
Dept: FAMILY MEDICINE CLINIC | Age: 33
End: 2023-10-16

## 2023-10-16 VITALS
SYSTOLIC BLOOD PRESSURE: 122 MMHG | DIASTOLIC BLOOD PRESSURE: 82 MMHG | HEART RATE: 103 BPM | HEIGHT: 70 IN | BODY MASS INDEX: 43.23 KG/M2 | OXYGEN SATURATION: 96 % | RESPIRATION RATE: 16 BRPM | WEIGHT: 302 LBS | TEMPERATURE: 97.1 F

## 2023-10-16 DIAGNOSIS — J06.9 UPPER RESPIRATORY TRACT INFECTION, UNSPECIFIED TYPE: Primary | ICD-10-CM

## 2023-10-16 DIAGNOSIS — I10 ESSENTIAL HYPERTENSION: ICD-10-CM

## 2023-10-16 LAB
INFLUENZA A ANTIBODY: NEGATIVE
INFLUENZA B ANTIBODY: NEGATIVE

## 2023-10-16 RX ORDER — BENZONATATE 100 MG/1
100 CAPSULE ORAL 3 TIMES DAILY PRN
Qty: 30 CAPSULE | Refills: 0 | Status: SHIPPED | OUTPATIENT
Start: 2023-10-16 | End: 2023-10-26

## 2023-10-16 RX ORDER — DEXTROMETHORPHAN HYDROBROMIDE AND PROMETHAZINE HYDROCHLORIDE 15; 6.25 MG/5ML; MG/5ML
5 SYRUP ORAL 4 TIMES DAILY PRN
Refills: 0 | Status: CANCELLED | OUTPATIENT
Start: 2023-10-16

## 2023-10-16 RX ORDER — AZITHROMYCIN 250 MG/1
250 TABLET, FILM COATED ORAL SEE ADMIN INSTRUCTIONS
Qty: 6 TABLET | Refills: 0 | Status: SHIPPED | OUTPATIENT
Start: 2023-10-16 | End: 2023-10-21

## 2023-10-16 RX ORDER — FLUTICASONE PROPIONATE 50 MCG
1 SPRAY, SUSPENSION (ML) NASAL DAILY
Qty: 16 G | Refills: 0 | Status: CANCELLED | OUTPATIENT
Start: 2023-10-16

## 2023-10-16 SDOH — ECONOMIC STABILITY: FOOD INSECURITY: WITHIN THE PAST 12 MONTHS, THE FOOD YOU BOUGHT JUST DIDN'T LAST AND YOU DIDN'T HAVE MONEY TO GET MORE.: NEVER TRUE

## 2023-10-16 SDOH — ECONOMIC STABILITY: INCOME INSECURITY: HOW HARD IS IT FOR YOU TO PAY FOR THE VERY BASICS LIKE FOOD, HOUSING, MEDICAL CARE, AND HEATING?: NOT HARD AT ALL

## 2023-10-16 SDOH — ECONOMIC STABILITY: FOOD INSECURITY: WITHIN THE PAST 12 MONTHS, YOU WORRIED THAT YOUR FOOD WOULD RUN OUT BEFORE YOU GOT MONEY TO BUY MORE.: NEVER TRUE

## 2023-10-16 ASSESSMENT — ENCOUNTER SYMPTOMS
HEMOPTYSIS: 0
RHINORRHEA: 0
COUGH: 1
SORE THROAT: 0
SHORTNESS OF BREATH: 0
HEARTBURN: 0
WHEEZING: 0

## 2023-10-16 ASSESSMENT — PATIENT HEALTH QUESTIONNAIRE - PHQ9
SUM OF ALL RESPONSES TO PHQ9 QUESTIONS 1 & 2: 0
2. FEELING DOWN, DEPRESSED OR HOPELESS: 0
SUM OF ALL RESPONSES TO PHQ QUESTIONS 1-9: 0
1. LITTLE INTEREST OR PLEASURE IN DOING THINGS: 0

## 2023-10-16 NOTE — PATIENT INSTRUCTIONS
Azithromycin to fill in 1-2 days if symptoms seem to be worsening or persist.  Azithromycin - day 1: 2 pills  DAYS 2-5: 1 pill daily for 4 days     Encouraged plenty of rest and adequate fluid intake. May use warm beverages with honey and lemon. Steamy showers may aid in clearing congestion.   -  Mucinex (guaifenesin) with water can also help loosen phlegm. - benzonatate pearls 3 times daily as needed for cough control during the day. Discussed warnings signs of pneumonia including increasing fatigue, malaise, development of fever, chest tightness, worsening of cough. If these signs are to develop, patient is to return for further work-up and evaluation.

## 2023-10-16 NOTE — PROGRESS NOTES
patient is to return for further work-up and evaluation. An electronic signature was used to authenticate this note.   --Radha Iqbal MD on 10/16/2023

## 2023-11-03 ENCOUNTER — OFFICE VISIT (OUTPATIENT)
Dept: CARDIOLOGY CLINIC | Age: 33
End: 2023-11-03

## 2023-11-03 VITALS
DIASTOLIC BLOOD PRESSURE: 78 MMHG | WEIGHT: 303.5 LBS | HEART RATE: 80 BPM | OXYGEN SATURATION: 98 % | SYSTOLIC BLOOD PRESSURE: 128 MMHG | BODY MASS INDEX: 43.45 KG/M2 | HEIGHT: 70 IN

## 2023-11-03 DIAGNOSIS — I10 ESSENTIAL HYPERTENSION: ICD-10-CM

## 2023-11-03 DIAGNOSIS — Z01.810 PREOP CARDIOVASCULAR EXAM: ICD-10-CM

## 2023-11-03 DIAGNOSIS — Z76.89 ESTABLISHING CARE WITH NEW DOCTOR, ENCOUNTER FOR: Primary | ICD-10-CM

## 2023-11-03 DIAGNOSIS — R07.2 PRECORDIAL PAIN: ICD-10-CM

## 2023-11-03 DIAGNOSIS — R94.31 ABNORMAL EKG: ICD-10-CM

## 2023-11-03 NOTE — PATIENT INSTRUCTIONS
Plan:  ~Stress echocardiogram- testing needed prior to clearance for surgery   ~Recommend a CT calcium score which is a test used as a screening tool for coronary artery disease. If the score is above 0, then would recommend Aspirin and Statin therapy. This test is considered a screening tool so it is not covered by insurance. Cardiac medications reviewed including indications and pertinent side effects. Medication list updated at this visit.    Check blood pressure and heart rate at home a few times per week- keep a log with dates and times and bring to office visit   Regular exercise and following a healthy diet encouraged   Follow up with me based on testing

## 2023-11-10 ENCOUNTER — TELEPHONE (OUTPATIENT)
Dept: CARDIOLOGY CLINIC | Age: 33
End: 2023-11-10

## 2023-11-10 DIAGNOSIS — R07.2 PRECORDIAL PAIN: ICD-10-CM

## 2023-11-10 DIAGNOSIS — Z01.810 PREOP CARDIOVASCULAR EXAM: Primary | ICD-10-CM

## 2023-11-10 NOTE — TELEPHONE ENCOUNTER
Spoke with pt and informed him test is cancelled and we can go ahead and schedule plain gxt stress test. The pt stated he is starting a new job next Tuesday and will need to call back to arrange the plain gxt after looking at his new schedule.

## 2023-11-10 NOTE — TELEPHONE ENCOUNTER
Please add Plain GXT order to pt's chart, contact pt with  response and please forward to  once pt is contacted by medical staff of change of testing and reason why.

## 2023-11-10 NOTE — TELEPHONE ENCOUNTER
Pt has been denied by insurance for his stress echo. Pls call the patient and re-schedule his appt and also advice him of the further steps following his denial.  Excerpts from the denial letter: Your doctor is checking you for heart disease. Your doctor ordered a special heart test. This test measures blood flow to the heart. This test should be used when there are certain abnormal findings on your EKG that would cause a treadmill stress test to be unclear. We reviewed the notes we have. The notes do not show that you had such abnormal findings on your EKG.      Pls call peer to peer for further discussion [phone]      Thanks

## 2023-12-01 ENCOUNTER — OFFICE VISIT (OUTPATIENT)
Dept: FAMILY MEDICINE CLINIC | Age: 33
End: 2023-12-01
Payer: COMMERCIAL

## 2023-12-01 VITALS
RESPIRATION RATE: 16 BRPM | BODY MASS INDEX: 44.05 KG/M2 | DIASTOLIC BLOOD PRESSURE: 72 MMHG | WEIGHT: 307 LBS | OXYGEN SATURATION: 98 % | SYSTOLIC BLOOD PRESSURE: 122 MMHG | HEART RATE: 83 BPM | TEMPERATURE: 97.1 F

## 2023-12-01 DIAGNOSIS — Z13.220 SCREENING CHOLESTEROL LEVEL: ICD-10-CM

## 2023-12-01 DIAGNOSIS — Z13.1 DIABETES MELLITUS SCREENING: ICD-10-CM

## 2023-12-01 DIAGNOSIS — E66.01 CLASS 3 SEVERE OBESITY DUE TO EXCESS CALORIES WITH SERIOUS COMORBIDITY AND BODY MASS INDEX (BMI) OF 40.0 TO 44.9 IN ADULT (HCC): ICD-10-CM

## 2023-12-01 DIAGNOSIS — I10 ESSENTIAL HYPERTENSION: ICD-10-CM

## 2023-12-01 DIAGNOSIS — I10 ESSENTIAL HYPERTENSION: Primary | ICD-10-CM

## 2023-12-01 LAB
ALBUMIN SERPL-MCNC: 4.5 G/DL (ref 3.4–5)
ALBUMIN/GLOB SERPL: 1.6 {RATIO} (ref 1.1–2.2)
ALP SERPL-CCNC: 73 U/L (ref 40–129)
ALT SERPL-CCNC: 40 U/L (ref 10–40)
ANION GAP SERPL CALCULATED.3IONS-SCNC: 12 MMOL/L (ref 3–16)
AST SERPL-CCNC: 22 U/L (ref 15–37)
BILIRUB SERPL-MCNC: 0.3 MG/DL (ref 0–1)
BUN SERPL-MCNC: 17 MG/DL (ref 7–20)
CALCIUM SERPL-MCNC: 9.4 MG/DL (ref 8.3–10.6)
CHLORIDE SERPL-SCNC: 100 MMOL/L (ref 99–110)
CHOLEST SERPL-MCNC: 169 MG/DL (ref 0–199)
CO2 SERPL-SCNC: 24 MMOL/L (ref 21–32)
CREAT SERPL-MCNC: 0.9 MG/DL (ref 0.9–1.3)
DEPRECATED RDW RBC AUTO: 13.7 % (ref 12.4–15.4)
GFR SERPLBLD CREATININE-BSD FMLA CKD-EPI: >60 ML/MIN/{1.73_M2}
GLUCOSE SERPL-MCNC: 116 MG/DL (ref 70–99)
HCT VFR BLD AUTO: 41.4 % (ref 40.5–52.5)
HDLC SERPL-MCNC: 24 MG/DL (ref 40–60)
HGB BLD-MCNC: 14.6 G/DL (ref 13.5–17.5)
LDL CHOLESTEROL CALCULATED: ABNORMAL MG/DL
LDLC SERPL-MCNC: 68 MG/DL
MCH RBC QN AUTO: 29.3 PG (ref 26–34)
MCHC RBC AUTO-ENTMCNC: 35.3 G/DL (ref 31–36)
MCV RBC AUTO: 83 FL (ref 80–100)
PLATELET # BLD AUTO: 237 K/UL (ref 135–450)
PMV BLD AUTO: 9 FL (ref 5–10.5)
POTASSIUM SERPL-SCNC: 4.4 MMOL/L (ref 3.5–5.1)
PROT SERPL-MCNC: 7.3 G/DL (ref 6.4–8.2)
RBC # BLD AUTO: 4.98 M/UL (ref 4.2–5.9)
SODIUM SERPL-SCNC: 136 MMOL/L (ref 136–145)
TRIGL SERPL-MCNC: 590 MG/DL (ref 0–150)
TSH SERPL DL<=0.005 MIU/L-ACNC: 1.07 UIU/ML (ref 0.27–4.2)
VLDLC SERPL CALC-MCNC: ABNORMAL MG/DL
WBC # BLD AUTO: 8.9 K/UL (ref 4–11)

## 2023-12-01 PROCEDURE — 99214 OFFICE O/P EST MOD 30 MIN: CPT | Performed by: NURSE PRACTITIONER

## 2023-12-01 PROCEDURE — 3074F SYST BP LT 130 MM HG: CPT | Performed by: NURSE PRACTITIONER

## 2023-12-01 PROCEDURE — 3078F DIAST BP <80 MM HG: CPT | Performed by: NURSE PRACTITIONER

## 2023-12-01 SDOH — ECONOMIC STABILITY: FOOD INSECURITY: WITHIN THE PAST 12 MONTHS, YOU WORRIED THAT YOUR FOOD WOULD RUN OUT BEFORE YOU GOT MONEY TO BUY MORE.: NEVER TRUE

## 2023-12-01 SDOH — ECONOMIC STABILITY: INCOME INSECURITY: HOW HARD IS IT FOR YOU TO PAY FOR THE VERY BASICS LIKE FOOD, HOUSING, MEDICAL CARE, AND HEATING?: NOT HARD AT ALL

## 2023-12-01 SDOH — ECONOMIC STABILITY: FOOD INSECURITY: WITHIN THE PAST 12 MONTHS, THE FOOD YOU BOUGHT JUST DIDN'T LAST AND YOU DIDN'T HAVE MONEY TO GET MORE.: NEVER TRUE

## 2023-12-01 ASSESSMENT — ENCOUNTER SYMPTOMS
WHEEZING: 0
DIARRHEA: 0
COUGH: 0
STRIDOR: 0
SHORTNESS OF BREATH: 0
VOMITING: 0
NAUSEA: 0

## 2023-12-01 ASSESSMENT — PATIENT HEALTH QUESTIONNAIRE - PHQ9
2. FEELING DOWN, DEPRESSED OR HOPELESS: 0
1. LITTLE INTEREST OR PLEASURE IN DOING THINGS: 0
SUM OF ALL RESPONSES TO PHQ QUESTIONS 1-9: 0
SUM OF ALL RESPONSES TO PHQ QUESTIONS 1-9: 0
SUM OF ALL RESPONSES TO PHQ9 QUESTIONS 1 & 2: 0
DEPRESSION UNABLE TO ASSESS: FUNCTIONAL CAPACITY MOTIVATION LIMITS ACCURACY
SUM OF ALL RESPONSES TO PHQ QUESTIONS 1-9: 0
SUM OF ALL RESPONSES TO PHQ QUESTIONS 1-9: 0

## 2023-12-01 ASSESSMENT — ANXIETY QUESTIONNAIRES
6. BECOMING EASILY ANNOYED OR IRRITABLE: 0
4. TROUBLE RELAXING: 0
IF YOU CHECKED OFF ANY PROBLEMS ON THIS QUESTIONNAIRE, HOW DIFFICULT HAVE THESE PROBLEMS MADE IT FOR YOU TO DO YOUR WORK, TAKE CARE OF THINGS AT HOME, OR GET ALONG WITH OTHER PEOPLE: NOT DIFFICULT AT ALL
1. FEELING NERVOUS, ANXIOUS, OR ON EDGE: 0
2. NOT BEING ABLE TO STOP OR CONTROL WORRYING: 0
7. FEELING AFRAID AS IF SOMETHING AWFUL MIGHT HAPPEN: 0
GAD7 TOTAL SCORE: 0
3. WORRYING TOO MUCH ABOUT DIFFERENT THINGS: 0
5. BEING SO RESTLESS THAT IT IS HARD TO SIT STILL: 0
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
## 2023-12-02 LAB
EST. AVERAGE GLUCOSE BLD GHB EST-MCNC: 96.8 MG/DL
HBA1C MFR BLD: 5 %

## 2023-12-03 PROBLEM — Z01.810 PREOP CARDIOVASCULAR EXAM: Status: RESOLVED | Noted: 2023-11-03 | Resolved: 2023-12-03

## 2023-12-06 ENCOUNTER — OFFICE VISIT (OUTPATIENT)
Dept: FAMILY MEDICINE CLINIC | Age: 33
End: 2023-12-06
Payer: COMMERCIAL

## 2023-12-06 VITALS
DIASTOLIC BLOOD PRESSURE: 60 MMHG | HEART RATE: 82 BPM | OXYGEN SATURATION: 97 % | RESPIRATION RATE: 16 BRPM | WEIGHT: 290 LBS | SYSTOLIC BLOOD PRESSURE: 116 MMHG | BODY MASS INDEX: 41.61 KG/M2

## 2023-12-06 DIAGNOSIS — H69.91 DYSFUNCTION OF RIGHT EUSTACHIAN TUBE: Primary | ICD-10-CM

## 2023-12-06 DIAGNOSIS — U07.1 COVID-19: ICD-10-CM

## 2023-12-06 PROCEDURE — 3074F SYST BP LT 130 MM HG: CPT | Performed by: NURSE PRACTITIONER

## 2023-12-06 PROCEDURE — 3078F DIAST BP <80 MM HG: CPT | Performed by: NURSE PRACTITIONER

## 2023-12-06 PROCEDURE — 99213 OFFICE O/P EST LOW 20 MIN: CPT | Performed by: NURSE PRACTITIONER

## 2023-12-06 SDOH — ECONOMIC STABILITY: FOOD INSECURITY: WITHIN THE PAST 12 MONTHS, THE FOOD YOU BOUGHT JUST DIDN'T LAST AND YOU DIDN'T HAVE MONEY TO GET MORE.: NEVER TRUE

## 2023-12-06 SDOH — ECONOMIC STABILITY: INCOME INSECURITY: HOW HARD IS IT FOR YOU TO PAY FOR THE VERY BASICS LIKE FOOD, HOUSING, MEDICAL CARE, AND HEATING?: NOT HARD AT ALL

## 2023-12-06 SDOH — ECONOMIC STABILITY: FOOD INSECURITY: WITHIN THE PAST 12 MONTHS, YOU WORRIED THAT YOUR FOOD WOULD RUN OUT BEFORE YOU GOT MONEY TO BUY MORE.: NEVER TRUE

## 2023-12-06 ASSESSMENT — ENCOUNTER SYMPTOMS
APNEA: 0
COUGH: 1
NAUSEA: 0
STRIDOR: 0
SHORTNESS OF BREATH: 0
CHOKING: 0
SORE THROAT: 1
FACIAL SWELLING: 0

## 2023-12-06 ASSESSMENT — PATIENT HEALTH QUESTIONNAIRE - PHQ9
SUM OF ALL RESPONSES TO PHQ QUESTIONS 1-9: 0
SUM OF ALL RESPONSES TO PHQ9 QUESTIONS 1 & 2: 0
1. LITTLE INTEREST OR PLEASURE IN DOING THINGS: 0
SUM OF ALL RESPONSES TO PHQ QUESTIONS 1-9: 0
2. FEELING DOWN, DEPRESSED OR HOPELESS: 0
SUM OF ALL RESPONSES TO PHQ QUESTIONS 1-9: 0
SUM OF ALL RESPONSES TO PHQ QUESTIONS 1-9: 0

## 2023-12-06 NOTE — PROGRESS NOTES
2023     Chief Complaint   Patient presents with    Ear Fullness     Right ear muffled and can't hear     Cough     X 1 week     did have Positive test saturday at home      Was seen at doctors was negative      Gene Traylor (:  1990) is a 26 y.o. male, here for evaluation of the following medical concerns:    HPI    Right ear pain  Right ear pain started two days ago, feels muffled in right ear. Saturday, 2023, he tested positive for COVID that same day. His symptoms fever resolved as of 12/3/2023. Still with persistent cough but seem to be improving. Denies any shortness of breath. Review of Systems   Constitutional:  Positive for fever. Negative for fatigue. HENT:  Positive for ear pain, hearing loss and sore throat. Negative for congestion, dental problem, drooling, ear discharge and facial swelling. Respiratory:  Positive for cough. Negative for apnea, choking, shortness of breath and stridor. Cardiovascular:  Negative for leg swelling. Gastrointestinal:  Negative for nausea. Neurological:  Negative for dizziness. Prior to Visit Medications    Medication Sig Taking?  Authorizing Provider   amLODIPine (NORVASC) 10 MG tablet Take 1 tablet by mouth daily Yes Lubna Ji APRN - CNP   lisinopril-hydroCHLOROthiazide (PRINZIDE;ZESTORETIC) 20-12.5 MG per tablet Take 2 tablets by mouth daily Yes KELLI Cole CNP   metoprolol succinate (TOPROL XL) 25 MG extended release tablet Take 1 tablet by mouth daily Yes Lorean Councilman, APRN - CNP   ciclopirox [facility]) 8 % solution Apply topically nightly Yes Lorean Councilman, APRN - CNP      Social History     Tobacco Use    Smoking status: Never     Passive exposure: Never    Smokeless tobacco: Former     Types: Chew     Quit date: 2021   Substance Use Topics    Alcohol use: Never        Vitals:    23 1514   BP: 116/60   Site: Left Upper Arm   Position: Sitting   Pulse: 82
02-Jun-2100

## 2023-12-06 NOTE — PATIENT INSTRUCTIONS
- start Zyrtec daily  - start Flonase two sprays up each side of the nose daily  - call next week if no improvement

## 2023-12-07 ENCOUNTER — HOSPITAL ENCOUNTER (OUTPATIENT)
Dept: NON INVASIVE DIAGNOSTICS | Age: 33
Discharge: HOME | End: 2023-12-07
Attending: INTERNAL MEDICINE
Payer: COMMERCIAL

## 2023-12-07 ENCOUNTER — TELEPHONE (OUTPATIENT)
Dept: FAMILY MEDICINE CLINIC | Age: 33
End: 2023-12-07

## 2023-12-07 ENCOUNTER — TELEPHONE (OUTPATIENT)
Dept: CARDIOLOGY CLINIC | Age: 33
End: 2023-12-07

## 2023-12-07 PROCEDURE — 93017 CV STRESS TEST TRACING ONLY: CPT

## 2023-12-07 NOTE — TELEPHONE ENCOUNTER
Spoke with Yenny Buck relayed message per [facility],[address]. Pt v/u and will pick letter up from the office on Friday.

## 2023-12-07 NOTE — PROGRESS NOTES
Plain GXT stress test complete. Patient denied chest pain, complained of being winded and \"shin splints\" all symptoms resolved with rest. Discharge instructions given to pt. Pt verbalizes understanding to discharge instructions.

## 2023-12-07 NOTE — TELEPHONE ENCOUNTER
----- Message from Brad Wolff MD sent at 12/7/2023  3:30 PM EST -----  Please notify patient that their stress test is normal  [facility] for surgery

## 2023-12-07 NOTE — TELEPHONE ENCOUNTER
Patient called for an update on KELLI Motley CNP sending a letter to [facility]  for medical necessity on his weight management surgery. His weight management provider notified him today that the letter had not been received at this time. The patient stated he will be picking up his cardiac clearance letter tomorrow from [facility] in [place], he requested to  his medical necessity letter from his PCP at this same time. Please advise.

## 2024-06-04 ENCOUNTER — OFFICE VISIT (OUTPATIENT)
Dept: FAMILY MEDICINE CLINIC | Age: 34
End: 2024-06-04
Payer: COMMERCIAL

## 2024-06-04 VITALS
WEIGHT: 303 LBS | TEMPERATURE: 97.1 F | OXYGEN SATURATION: 99 % | SYSTOLIC BLOOD PRESSURE: 144 MMHG | DIASTOLIC BLOOD PRESSURE: 90 MMHG | BODY MASS INDEX: 43.48 KG/M2 | HEART RATE: 78 BPM | RESPIRATION RATE: 16 BRPM

## 2024-06-04 DIAGNOSIS — I10 ESSENTIAL HYPERTENSION: Primary | ICD-10-CM

## 2024-06-04 DIAGNOSIS — Z72.0 CHEWS TOBACCO: ICD-10-CM

## 2024-06-04 DIAGNOSIS — E66.01 CLASS 3 SEVERE OBESITY DUE TO EXCESS CALORIES WITH SERIOUS COMORBIDITY AND BODY MASS INDEX (BMI) OF 40.0 TO 44.9 IN ADULT (HCC): ICD-10-CM

## 2024-06-04 DIAGNOSIS — E78.00 HYPERCHOLESTEREMIA: ICD-10-CM

## 2024-06-04 PROCEDURE — 3077F SYST BP >= 140 MM HG: CPT | Performed by: NURSE PRACTITIONER

## 2024-06-04 PROCEDURE — 99213 OFFICE O/P EST LOW 20 MIN: CPT | Performed by: NURSE PRACTITIONER

## 2024-06-04 PROCEDURE — 3080F DIAST BP >= 90 MM HG: CPT | Performed by: NURSE PRACTITIONER

## 2024-06-04 SDOH — ECONOMIC STABILITY: FOOD INSECURITY: WITHIN THE PAST 12 MONTHS, YOU WORRIED THAT YOUR FOOD WOULD RUN OUT BEFORE YOU GOT MONEY TO BUY MORE.: NEVER TRUE

## 2024-06-04 SDOH — ECONOMIC STABILITY: FOOD INSECURITY: WITHIN THE PAST 12 MONTHS, THE FOOD YOU BOUGHT JUST DIDN'T LAST AND YOU DIDN'T HAVE MONEY TO GET MORE.: NEVER TRUE

## 2024-06-04 SDOH — ECONOMIC STABILITY: INCOME INSECURITY: HOW HARD IS IT FOR YOU TO PAY FOR THE VERY BASICS LIKE FOOD, HOUSING, MEDICAL CARE, AND HEATING?: NOT HARD AT ALL

## 2024-06-04 ASSESSMENT — ENCOUNTER SYMPTOMS
COUGH: 0
DIARRHEA: 0
NAUSEA: 0
SHORTNESS OF BREATH: 0
VOMITING: 0

## 2024-06-04 ASSESSMENT — ANXIETY QUESTIONNAIRES
3. WORRYING TOO MUCH ABOUT DIFFERENT THINGS: NOT AT ALL
6. BECOMING EASILY ANNOYED OR IRRITABLE: NOT AT ALL
4. TROUBLE RELAXING: NOT AT ALL
5. BEING SO RESTLESS THAT IT IS HARD TO SIT STILL: NOT AT ALL
7. FEELING AFRAID AS IF SOMETHING AWFUL MIGHT HAPPEN: NOT AT ALL
2. NOT BEING ABLE TO STOP OR CONTROL WORRYING: NOT AT ALL
IF YOU CHECKED OFF ANY PROBLEMS ON THIS QUESTIONNAIRE, HOW DIFFICULT HAVE THESE PROBLEMS MADE IT FOR YOU TO DO YOUR WORK, TAKE CARE OF THINGS AT HOME, OR GET ALONG WITH OTHER PEOPLE: NOT DIFFICULT AT ALL
1. FEELING NERVOUS, ANXIOUS, OR ON EDGE: NOT AT ALL
GAD7 TOTAL SCORE: 0

## 2024-06-04 ASSESSMENT — PATIENT HEALTH QUESTIONNAIRE - PHQ9
SUM OF ALL RESPONSES TO PHQ QUESTIONS 1-9: 0
SUM OF ALL RESPONSES TO PHQ QUESTIONS 1-9: 0
DEPRESSION UNABLE TO ASSESS: FUNCTIONAL CAPACITY MOTIVATION LIMITS ACCURACY
SUM OF ALL RESPONSES TO PHQ QUESTIONS 1-9: 0
1. LITTLE INTEREST OR PLEASURE IN DOING THINGS: NOT AT ALL
SUM OF ALL RESPONSES TO PHQ QUESTIONS 1-9: 0
2. FEELING DOWN, DEPRESSED OR HOPELESS: NOT AT ALL
SUM OF ALL RESPONSES TO PHQ9 QUESTIONS 1 & 2: 0

## 2024-06-04 NOTE — PROGRESS NOTES
2024     Chief Complaint   Patient presents with    Hypertension     Luis Wang (:  1990) is a 34 y.o. male, here for evaluation of the following medical concerns:    HPI    Hypertension:  Home blood pressure monitoring: Yes - controlled.  He is not adherent to a low sodium diet. He is more consistent with taking his medication. His blood pressure is well controlled. Denies CP or SOB. Has been working with a  with diet and exercise for the last 2 months. He has not lost any weight. He is exercising regularly and more frequently. BP is initially elevated but did improve on recheck    BP uncontrolled today, he did take medications this morning but missed them the last two days  He is not checking BP at home   Doing more stress eating- trying to sell house up here to move to [place]  Not working with weight loss clinic because he did not quit tobacco (smokeless)     Had stress test in December which was normal    Feels well  No concerns    Family history of heart diease in mother in her 40's with CABG surgery and stents. Grandfather had ICD      Review of Systems   Constitutional:  Negative for fatigue and fever.   Respiratory:  Negative for cough and shortness of breath.    Cardiovascular:  Negative for chest pain and leg swelling.   Gastrointestinal:  Negative for diarrhea, nausea and vomiting.   Neurological:  Negative for dizziness and headaches.       Prior to Visit Medications    Medication Sig Taking? Authorizing Provider   amLODIPine (NORVASC) 10 MG tablet Take 1 tablet by mouth daily Yes Monique Martinez APRN - CNP   lisinopril-hydroCHLOROthiazide (PRINZIDE;ZESTORETIC) 20-12.5 MG per tablet Take 2 tablets by mouth daily Yes Monique Martinez APRN - CNP   metoprolol succinate (TOPROL XL) 25 MG extended release tablet Take 1 tablet by mouth daily Yes Monique aMrtinez APRN - CNP   ciclopirox (PENLAC) 8 % solution Apply topically nightly Yes Monique Martinez APRN

## 2024-06-04 NOTE — PATIENT INSTRUCTIONS
- blood work today  - take medications daily  - monitor BP at home daily  - BP goal < 140/90  - follow up 6 months routine

## 2024-07-23 ENCOUNTER — OFFICE VISIT (OUTPATIENT)
Dept: FAMILY MEDICINE CLINIC | Age: 34
End: 2024-07-23
Payer: COMMERCIAL

## 2024-07-23 VITALS
DIASTOLIC BLOOD PRESSURE: 85 MMHG | RESPIRATION RATE: 16 BRPM | TEMPERATURE: 97.1 F | HEART RATE: 85 BPM | BODY MASS INDEX: 43.76 KG/M2 | OXYGEN SATURATION: 95 % | WEIGHT: 305 LBS | SYSTOLIC BLOOD PRESSURE: 126 MMHG

## 2024-07-23 DIAGNOSIS — E78.00 HYPERCHOLESTEREMIA: ICD-10-CM

## 2024-07-23 DIAGNOSIS — S76.212A INGUINAL STRAIN, LEFT, INITIAL ENCOUNTER: Primary | ICD-10-CM

## 2024-07-23 DIAGNOSIS — I10 ESSENTIAL HYPERTENSION: ICD-10-CM

## 2024-07-23 DIAGNOSIS — E66.01 CLASS 3 SEVERE OBESITY DUE TO EXCESS CALORIES WITH SERIOUS COMORBIDITY AND BODY MASS INDEX (BMI) OF 40.0 TO 44.9 IN ADULT (HCC): ICD-10-CM

## 2024-07-23 PROCEDURE — 3074F SYST BP LT 130 MM HG: CPT | Performed by: NURSE PRACTITIONER

## 2024-07-23 PROCEDURE — 3079F DIAST BP 80-89 MM HG: CPT | Performed by: NURSE PRACTITIONER

## 2024-07-23 PROCEDURE — 99214 OFFICE O/P EST MOD 30 MIN: CPT | Performed by: NURSE PRACTITIONER

## 2024-07-23 ASSESSMENT — ENCOUNTER SYMPTOMS
SHORTNESS OF BREATH: 0
DIARRHEA: 0
NAUSEA: 0
COUGH: 0
VOMITING: 0

## 2024-07-23 NOTE — PROGRESS NOTES
2024     Chief Complaint   Patient presents with    Other     Would like to see if he has a hernia     Hypertension       Luis Wang (:  1990) is a 34 y.o. male, here for evaluation of the following medical concerns:    HPI    Hypertension:  Home blood pressure monitoring: Yes - controlled.  He is not adherent to a low sodium diet. He is more consistent with taking his medication. His blood pressure is well controlled. Denies CP or SOB. Has been working with a  with diet and exercise.  Moving to [place] next weekend.  He is excited to be closer to his family.    Has some left groin pain for the last few weeks, denies injury.  Some pain with sitting or laying in certain positions.  There is no bulge,.  Denies testicular pain or swelling.  No dysuria, urgency, frequency.  He has tried no treatment for symptoms for                                      Sodium (mmol/L)   Date Value   2023 136    BUN (mg/dL)   Date Value   2023 17    Glucose (mg/dL)   Date Value   2023 116 (H)      Potassium (mmol/L)   Date Value   2023 4.4    Creatinine (mg/dL)   Date Value   2023 0.9           Review of Systems   Constitutional:  Negative for fatigue and fever.   Respiratory:  Negative for cough and shortness of breath.    Cardiovascular:  Negative for chest pain and leg swelling.   Gastrointestinal:  Negative for diarrhea, nausea and vomiting.   Musculoskeletal:  Positive for arthralgias.   Neurological:  Negative for dizziness and headaches.       Prior to Visit Medications    Medication Sig Taking? Authorizing Provider   amLODIPine (NORVASC) 10 MG tablet Take 1 tablet by mouth daily Yes Monique Martinez APRN - CNP   lisinopril-hydroCHLOROthiazide (PRINZIDE;ZESTORETIC) 20-12.5 MG per tablet Take 2 tablets by mouth daily Yes Monique Martinez APRN - CNP   metoprolol succinate (TOPROL XL) 25 MG extended release tablet Take 1 tablet by mouth daily Yes Juan

## 2024-07-23 NOTE — PATIENT INSTRUCTIONS
- no hernia left groin  - monitor groin pain  - recommend trying Aleve twice daily for 1-2 weeks   - recommend seeing doctor in [place] in a few weeks if symptoms are not improving

## 2024-09-15 DIAGNOSIS — I10 ESSENTIAL HYPERTENSION: ICD-10-CM

## 2024-09-16 RX ORDER — LISINOPRIL AND HYDROCHLOROTHIAZIDE 12.5; 2 MG/1; MG/1
2 TABLET ORAL DAILY
Qty: 180 TABLET | Refills: 0 | Status: SHIPPED | OUTPATIENT
Start: 2024-09-16

## 2024-09-16 RX ORDER — AMLODIPINE BESYLATE 10 MG/1
10 TABLET ORAL DAILY
Qty: 90 TABLET | Refills: 3 | OUTPATIENT
Start: 2024-09-16

## 2024-09-16 RX ORDER — METOPROLOL SUCCINATE 25 MG/1
25 TABLET, EXTENDED RELEASE ORAL DAILY
Qty: 90 TABLET | Refills: 0 | Status: SHIPPED | OUTPATIENT
Start: 2024-09-16

## 2024-09-16 RX ORDER — METOPROLOL SUCCINATE 25 MG/1
25 TABLET, EXTENDED RELEASE ORAL DAILY
Qty: 90 TABLET | Refills: 3 | OUTPATIENT
Start: 2024-09-16

## 2024-09-16 RX ORDER — LISINOPRIL AND HYDROCHLOROTHIAZIDE 12.5; 2 MG/1; MG/1
2 TABLET ORAL DAILY
Qty: 180 TABLET | Refills: 3 | OUTPATIENT
Start: 2024-09-16

## 2024-09-16 RX ORDER — AMLODIPINE BESYLATE 10 MG/1
10 TABLET ORAL DAILY
Qty: 90 TABLET | Refills: 0 | Status: SHIPPED | OUTPATIENT
Start: 2024-09-16